# Patient Record
Sex: FEMALE | Race: OTHER | HISPANIC OR LATINO | URBAN - METROPOLITAN AREA
[De-identification: names, ages, dates, MRNs, and addresses within clinical notes are randomized per-mention and may not be internally consistent; named-entity substitution may affect disease eponyms.]

---

## 2024-01-06 ENCOUNTER — HOSPITAL ENCOUNTER (EMERGENCY)
Facility: HOSPITAL | Age: 74
Discharge: HOME | End: 2024-01-06
Attending: EMERGENCY MEDICINE

## 2024-01-06 VITALS
SYSTOLIC BLOOD PRESSURE: 186 MMHG | RESPIRATION RATE: 20 BRPM | OXYGEN SATURATION: 100 % | DIASTOLIC BLOOD PRESSURE: 86 MMHG | HEART RATE: 101 BPM | TEMPERATURE: 98 F

## 2024-01-06 DIAGNOSIS — R05.9 COUGH: ICD-10-CM

## 2024-01-06 DIAGNOSIS — I10 HTN (HYPERTENSION): ICD-10-CM

## 2024-01-06 DIAGNOSIS — J40 BRONCHITIS: Primary | ICD-10-CM

## 2024-01-06 DIAGNOSIS — Z76.0 MEDICATION REFILL: ICD-10-CM

## 2024-01-06 LAB
CTP QC/QA: YES
CTP QC/QA: YES
POC MOLECULAR INFLUENZA A AGN: NEGATIVE
POC MOLECULAR INFLUENZA B AGN: NEGATIVE
SARS-COV-2 RDRP RESP QL NAA+PROBE: NEGATIVE

## 2024-01-06 PROCEDURE — 87635 SARS-COV-2 COVID-19 AMP PRB: CPT | Performed by: EMERGENCY MEDICINE

## 2024-01-06 PROCEDURE — 93010 ELECTROCARDIOGRAM REPORT: CPT | Mod: ,,, | Performed by: INTERNAL MEDICINE

## 2024-01-06 PROCEDURE — 99284 EMERGENCY DEPT VISIT MOD MDM: CPT | Mod: 25

## 2024-01-06 PROCEDURE — 93005 ELECTROCARDIOGRAM TRACING: CPT

## 2024-01-06 PROCEDURE — 25000003 PHARM REV CODE 250: Performed by: PHYSICIAN ASSISTANT

## 2024-01-06 PROCEDURE — 87502 INFLUENZA DNA AMP PROBE: CPT

## 2024-01-06 PROCEDURE — 63600175 PHARM REV CODE 636 W HCPCS: Performed by: PHYSICIAN ASSISTANT

## 2024-01-06 RX ORDER — PREDNISONE 20 MG/1
60 TABLET ORAL DAILY
Qty: 9 TABLET | Refills: 0 | Status: SHIPPED | OUTPATIENT
Start: 2024-01-06 | End: 2024-01-09

## 2024-01-06 RX ORDER — LOSARTAN POTASSIUM 50 MG/1
40 TABLET ORAL DAILY
COMMUNITY
End: 2024-01-06 | Stop reason: CLARIF

## 2024-01-06 RX ORDER — OLMESARTAN MEDOXOMIL 40 MG/1
40 TABLET ORAL DAILY
Qty: 30 TABLET | Refills: 0 | Status: SHIPPED | OUTPATIENT
Start: 2024-01-06

## 2024-01-06 RX ORDER — ACETAMINOPHEN 500 MG
1000 TABLET ORAL
Status: COMPLETED | OUTPATIENT
Start: 2024-01-06 | End: 2024-01-06

## 2024-01-06 RX ORDER — PREDNISONE 20 MG/1
60 TABLET ORAL
Status: COMPLETED | OUTPATIENT
Start: 2024-01-06 | End: 2024-01-06

## 2024-01-06 RX ORDER — OLMESARTAN MEDOXOMIL 40 MG/1
40 TABLET ORAL DAILY
COMMUNITY
End: 2024-01-06

## 2024-01-06 RX ORDER — ALBUTEROL SULFATE 90 UG/1
1-2 AEROSOL, METERED RESPIRATORY (INHALATION) EVERY 6 HOURS PRN
Qty: 6.7 G | Refills: 1 | Status: SHIPPED | OUTPATIENT
Start: 2024-01-06

## 2024-01-06 RX ORDER — PROMETHAZINE HYDROCHLORIDE AND DEXTROMETHORPHAN HYDROBROMIDE 6.25; 15 MG/5ML; MG/5ML
5 SYRUP ORAL EVERY 4 HOURS PRN
Qty: 118 ML | Refills: 0 | Status: SHIPPED | OUTPATIENT
Start: 2024-01-06 | End: 2024-01-16

## 2024-01-06 RX ADMIN — ACETAMINOPHEN 1000 MG: 500 TABLET ORAL at 12:01

## 2024-01-06 RX ADMIN — PREDNISONE 60 MG: 20 TABLET ORAL at 01:01

## 2024-01-06 NOTE — ED PROVIDER NOTES
Encounter Date: 1/6/2024    SCRIBE #1 NOTE: I, Harini Figueroa, am scribing for, and in the presence of,  Jake Camarena PA-C.       History     Chief Complaint   Patient presents with    Headache     Pt to ER with reports of headache, cough, fever, since December 31st.      Monique Merchant is a 73 y.o. female, with a PMHx of Arthritis, HTN, who presents to the ED with a productive cough x1 week. Patient reports associated bilateral ear pressure and a frontal headache relieved with tylenol. Additionally asks for a refill of her antihypertensives but is unsure of which kind. Denies Hx of tobacco use, asthma or bronchitis. No other exacerbating or alleviating factors. Denies CP, SOB, nausea, vomiting, sore throat, congestion, fever or other associated symptoms.   Patient's family served as  via phone call as patient speak's Maori.  services were offered and declined.     The history is provided by the patient. No  was used.     Review of patient's allergies indicates:  No Known Allergies  Past Medical History:   Diagnosis Date    Arthritis     Hypertension      History reviewed. No pertinent surgical history.  History reviewed. No pertinent family history.  Social History     Tobacco Use    Smoking status: Never    Smokeless tobacco: Never   Substance Use Topics    Alcohol use: Never    Drug use: Never     Review of Systems   Constitutional:  Negative for fever.   HENT:  Negative for congestion, rhinorrhea and sore throat.         (+) ear pressure, bilateral   Eyes:  Negative for redness.   Respiratory:  Positive for cough. Negative for shortness of breath.    Cardiovascular:  Negative for chest pain and leg swelling.   Gastrointestinal:  Negative for abdominal pain, diarrhea, nausea and vomiting.   Musculoskeletal:  Negative for back pain.   Skin:  Negative for rash.   Neurological:  Positive for headaches. Negative for syncope.   All  other systems reviewed and are negative.      Physical Exam     Initial Vitals [01/06/24 1229]   BP Pulse Resp Temp SpO2   (!) 186/86 101 20 98.4 °F (36.9 °C) 100 %      MAP       --         Physical Exam    Nursing note and vitals reviewed.  Constitutional: She appears well-developed and well-nourished. She is not diaphoretic. No distress.   HENT:   Head: Atraumatic.   Right Ear: External ear normal.   Left Ear: External ear normal.   Bilateral non purulent ear effusions.  Bony landmarks discernible.  Nasal congestion without active rhinorrhea or sinus tenderness.  Dry mucous membranes with oropharynx otherwise normal.   Eyes: Conjunctivae and EOM are normal.   Neck: No tracheal deviation present.   Normal range of motion.  Cardiovascular:  Normal rate and regular rhythm.           Pulmonary/Chest: No accessory muscle usage or stridor. No tachypnea. No respiratory distress.   Faint end-expiratory wheezing   Musculoskeletal:         General: No edema. Normal range of motion.      Cervical back: Normal range of motion.     Neurological: She is alert and oriented to person, place, and time. She displays no tremor. She displays no seizure activity. Coordination and gait normal.   Skin: Skin is intact. Capillary refill takes less than 2 seconds. No rash noted. No erythema.         ED Course   Procedures  Labs Reviewed   POCT INFLUENZA A/B MOLECULAR   SARS-COV-2 RDRP GENE     EKG Readings: (Independently Interpreted)   Initial Reading: No STEMI. Rhythm: Normal Sinus Rhythm. Heart Rate: 88. Axis: Normal.       Imaging Results              X-Ray Chest AP Portable (Final result)  Result time 01/06/24 13:12:18      Final result by Valery Grey MD (01/06/24 13:12:18)                   Impression:      No acute abnormality.      Electronically signed by: Valery Grey MD  Date:    01/06/2024  Time:    13:12               Narrative:    EXAMINATION:  XR CHEST AP PORTABLE    CLINICAL HISTORY:  Cough,  unspecified    TECHNIQUE:  Single frontal view of the chest was performed.    COMPARISON:  None    FINDINGS:  The lungs are clear with normal appearance of pulmonary vasculature. No pleural effusion. No evident pneumothorax.    The cardiac silhouette is normal in size. The hilar and mediastinal contours are unremarkable.    Bones are intact.                                       Medications   acetaminophen tablet 1,000 mg (1,000 mg Oral Given 1/6/24 1252)   predniSONE tablet 60 mg (60 mg Oral Given 1/6/24 1344)     Medical Decision Making  Viral URI with associated bronchitis.  No hypoxia or respiratory distress.  No bacterial pneumonia or effusion.  EKG normal and consistent with ACS today.  COVID-19 and flu negative.  Bilateral ear effusions; non purulent. BP med refill.     Amount and/or Complexity of Data Reviewed  Radiology: ordered.    Risk  OTC drugs.  Prescription drug management.            Scribe Attestation:   Scribe #1: I performed the above scribed service and the documentation accurately describes the services I performed. I attest to the accuracy of the note.                          I, Jake Camarena PA-C, personally performed the services described in this documentation.  All medical record entries made by the scribe were at my direction and in my presence.  I have reviewed the chart and agree that the record reflects my personal performance and is accurate and complete.        Clinical Impression:  Final diagnoses:  [I10] HTN (hypertension)  [R05.9] Cough  [J40] Bronchitis (Primary)  [Z76.0] Medication refill          ED Disposition Condition    Discharge Stable          ED Prescriptions       Medication Sig Dispense Start Date End Date Auth. Provider    albuterol (PROVENTIL/VENTOLIN HFA) 90 mcg/actuation inhaler Inhale 1-2 puffs into the lungs every 6 (six) hours as needed for Wheezing. Rescue 6.7 g 1/6/2024 -- Jake Camarena PA-C    predniSONE (DELTASONE) 20 MG tablet Take 3 tablets (60 mg  total) by mouth once daily. for 3 days 9 tablet 1/6/2024 1/9/2024 Jake Camarena PA-C    promethazine-dextromethorphan (PROMETHAZINE-DM) 6.25-15 mg/5 mL Syrp Take 5 mLs by mouth every 4 (four) hours as needed (cough). 118 mL 1/6/2024 1/16/2024 Jake Camarena PA-C    olmesartan (BENICAR) 40 MG tablet Take 1 tablet (40 mg total) by mouth once daily. 30 tablet 1/6/2024 -- Jake Camarena PA-C          Follow-up Information       Follow up With Specialties Details Why Contact Info    [facility] -  Schedule an appointment as soon as possible for a visit in 1 day For reevaluation, AND to establish primary if you don't have a  [address] [place] [address]  [phone]      [facility] - Emergency Dept Emergency Medicine Go to  If symptoms worsen or new symptoms develop [address] [facility] [place] [id number] [phone]             Jake Camarena PA-C  01/06/24 9759

## 2024-01-11 ENCOUNTER — HOSPITAL ENCOUNTER (EMERGENCY)
Facility: HOSPITAL | Age: 74
Discharge: HOME | End: 2024-01-11
Attending: EMERGENCY MEDICINE

## 2024-01-11 VITALS
RESPIRATION RATE: 19 BRPM | HEART RATE: 75 BPM | HEIGHT: 65 IN | DIASTOLIC BLOOD PRESSURE: 84 MMHG | TEMPERATURE: 99 F | WEIGHT: 135 LBS | SYSTOLIC BLOOD PRESSURE: 174 MMHG | OXYGEN SATURATION: 99 % | BODY MASS INDEX: 22.49 KG/M2

## 2024-01-11 DIAGNOSIS — R00.0 TACHYCARDIA: ICD-10-CM

## 2024-01-11 DIAGNOSIS — J06.9 UPPER RESPIRATORY TRACT INFECTION, UNSPECIFIED TYPE: Primary | ICD-10-CM

## 2024-01-11 LAB
ALBUMIN SERPL BCP-MCNC: 3.9 G/DL (ref 3.5–5.2)
ALLENS TEST: NORMAL
ALP SERPL-CCNC: 81 U/L (ref 55–135)
ALT SERPL W/O P-5'-P-CCNC: 21 U/L (ref 10–44)
ANION GAP SERPL CALC-SCNC: 10 MMOL/L (ref 8–16)
AST SERPL-CCNC: 26 U/L (ref 10–40)
BASOPHILS # BLD AUTO: 0.06 K/UL (ref 0–0.2)
BASOPHILS NFR BLD: 0.4 % (ref 0–1.9)
BILIRUB SERPL-MCNC: 0.3 MG/DL (ref 0.1–1)
BILIRUB UR QL STRIP: NEGATIVE
BNP SERPL-MCNC: 12 PG/ML (ref 0–99)
BUN SERPL-MCNC: 23 MG/DL (ref 8–23)
CALCIUM SERPL-MCNC: 9.3 MG/DL (ref 8.7–10.5)
CHLORIDE SERPL-SCNC: 103 MMOL/L (ref 95–110)
CLARITY UR: CLEAR
CO2 SERPL-SCNC: 26 MMOL/L (ref 23–29)
COLOR UR: YELLOW
CREAT SERPL-MCNC: 0.9 MG/DL (ref 0.5–1.4)
CTP QC/QA: YES
CTP QC/QA: YES
DIFFERENTIAL METHOD BLD: ABNORMAL
EOSINOPHIL # BLD AUTO: 0.3 K/UL (ref 0–0.5)
EOSINOPHIL NFR BLD: 2.5 % (ref 0–8)
ERYTHROCYTE [DISTWIDTH] IN BLOOD BY AUTOMATED COUNT: 11.9 % (ref 11.5–14.5)
EST. GFR  (NO RACE VARIABLE): >60 ML/MIN/1.73 M^2
GLUCOSE SERPL-MCNC: 157 MG/DL (ref 70–110)
GLUCOSE UR QL STRIP: NEGATIVE
HCT VFR BLD AUTO: 41.6 % (ref 37–48.5)
HGB BLD-MCNC: 13.7 G/DL (ref 12–16)
HGB UR QL STRIP: NEGATIVE
HYALINE CASTS #/AREA URNS LPF: 1 /LPF
IMM GRANULOCYTES # BLD AUTO: 0.17 K/UL (ref 0–0.04)
IMM GRANULOCYTES NFR BLD AUTO: 1.2 % (ref 0–0.5)
KETONES UR QL STRIP: NEGATIVE
LDH SERPL L TO P-CCNC: 1.57 MMOL/L (ref 0.5–2.2)
LEUKOCYTE ESTERASE UR QL STRIP: ABNORMAL
LYMPHOCYTES # BLD AUTO: 6.5 K/UL (ref 1–4.8)
LYMPHOCYTES NFR BLD: 47.4 % (ref 18–48)
MCH RBC QN AUTO: 30 PG (ref 27–31)
MCHC RBC AUTO-ENTMCNC: 32.9 G/DL (ref 32–36)
MCV RBC AUTO: 91 FL (ref 82–98)
MICROSCOPIC COMMENT: ABNORMAL
MONOCYTES # BLD AUTO: 0.9 K/UL (ref 0.3–1)
MONOCYTES NFR BLD: 6.5 % (ref 4–15)
NEUTROPHILS # BLD AUTO: 5.8 K/UL (ref 1.8–7.7)
NEUTROPHILS NFR BLD: 42 % (ref 38–73)
NITRITE UR QL STRIP: NEGATIVE
NON-SQ EPI CELLS #/AREA URNS HPF: 1 /HPF
NRBC BLD-RTO: 0 /100 WBC
PH UR STRIP: 6 [PH] (ref 5–8)
PLATELET # BLD AUTO: 340 K/UL (ref 150–450)
PMV BLD AUTO: 9.8 FL (ref 9.2–12.9)
POC MOLECULAR INFLUENZA A AGN: NEGATIVE
POC MOLECULAR INFLUENZA B AGN: NEGATIVE
POTASSIUM SERPL-SCNC: 3.8 MMOL/L (ref 3.5–5.1)
PROT SERPL-MCNC: 8.1 G/DL (ref 6–8.4)
PROT UR QL STRIP: NEGATIVE
RBC # BLD AUTO: 4.57 M/UL (ref 4–5.4)
RBC #/AREA URNS HPF: 2 /HPF (ref 0–4)
SAMPLE: NORMAL
SARS-COV-2 RDRP RESP QL NAA+PROBE: NEGATIVE
SITE: NORMAL
SODIUM SERPL-SCNC: 139 MMOL/L (ref 136–145)
SP GR UR STRIP: 1.01 (ref 1–1.03)
TROPONIN I SERPL DL<=0.01 NG/ML-MCNC: <0.006 NG/ML (ref 0–0.03)
URN SPEC COLLECT METH UR: ABNORMAL
UROBILINOGEN UR STRIP-ACNC: NEGATIVE EU/DL
WBC # BLD AUTO: 13.78 K/UL (ref 3.9–12.7)
WBC #/AREA URNS HPF: 45 /HPF (ref 0–5)
WBC CLUMPS URNS QL MICRO: ABNORMAL

## 2024-01-11 PROCEDURE — 80053 COMPREHEN METABOLIC PANEL: CPT

## 2024-01-11 PROCEDURE — 87635 SARS-COV-2 COVID-19 AMP PRB: CPT | Performed by: EMERGENCY MEDICINE

## 2024-01-11 PROCEDURE — 81000 URINALYSIS NONAUTO W/SCOPE: CPT

## 2024-01-11 PROCEDURE — 96365 THER/PROPH/DIAG IV INF INIT: CPT

## 2024-01-11 PROCEDURE — 25000003 PHARM REV CODE 250

## 2024-01-11 PROCEDURE — 93005 ELECTROCARDIOGRAM TRACING: CPT

## 2024-01-11 PROCEDURE — 99285 EMERGENCY DEPT VISIT HI MDM: CPT | Mod: 25

## 2024-01-11 PROCEDURE — 63600175 PHARM REV CODE 636 W HCPCS

## 2024-01-11 PROCEDURE — 99900035 HC TECH TIME PER 15 MIN (STAT)

## 2024-01-11 PROCEDURE — 83605 ASSAY OF LACTIC ACID: CPT

## 2024-01-11 PROCEDURE — 84484 ASSAY OF TROPONIN QUANT: CPT

## 2024-01-11 PROCEDURE — 87502 INFLUENZA DNA AMP PROBE: CPT

## 2024-01-11 PROCEDURE — 87086 URINE CULTURE/COLONY COUNT: CPT

## 2024-01-11 PROCEDURE — 93010 ELECTROCARDIOGRAM REPORT: CPT | Mod: ,,, | Performed by: INTERNAL MEDICINE

## 2024-01-11 PROCEDURE — 96367 TX/PROPH/DG ADDL SEQ IV INF: CPT

## 2024-01-11 PROCEDURE — 83880 ASSAY OF NATRIURETIC PEPTIDE: CPT

## 2024-01-11 PROCEDURE — 87040 BLOOD CULTURE FOR BACTERIA: CPT | Mod: 59

## 2024-01-11 PROCEDURE — 85025 COMPLETE CBC W/AUTO DIFF WBC: CPT

## 2024-01-11 RX ORDER — BENZONATATE 100 MG/1
100 CAPSULE ORAL 3 TIMES DAILY PRN
Qty: 30 CAPSULE | Refills: 0 | Status: SHIPPED | OUTPATIENT
Start: 2024-01-11 | End: 2024-01-21

## 2024-01-11 RX ORDER — GUAIFENESIN 600 MG/1
1200 TABLET, EXTENDED RELEASE ORAL 2 TIMES DAILY
Qty: 40 TABLET | Refills: 0 | Status: SHIPPED | OUTPATIENT
Start: 2024-01-11 | End: 2024-01-21

## 2024-01-11 RX ORDER — AZITHROMYCIN 250 MG/1
250 TABLET, FILM COATED ORAL DAILY
Qty: 6 TABLET | Refills: 0 | Status: SHIPPED | OUTPATIENT
Start: 2024-01-11

## 2024-01-11 RX ORDER — ACETAMINOPHEN 500 MG
1000 TABLET ORAL
Status: COMPLETED | OUTPATIENT
Start: 2024-01-11 | End: 2024-01-11

## 2024-01-11 RX ADMIN — VANCOMYCIN HYDROCHLORIDE 1500 MG: 1.5 INJECTION, POWDER, LYOPHILIZED, FOR SOLUTION INTRAVENOUS at 02:01

## 2024-01-11 RX ADMIN — ACETAMINOPHEN 1000 MG: 500 TABLET ORAL at 02:01

## 2024-01-11 RX ADMIN — SODIUM CHLORIDE, POTASSIUM CHLORIDE, SODIUM LACTATE AND CALCIUM CHLORIDE 1836 ML: 600; 310; 30; 20 INJECTION, SOLUTION INTRAVENOUS at 02:01

## 2024-01-11 RX ADMIN — PIPERACILLIN AND TAZOBACTAM 4.5 G: 4; .5 INJECTION, POWDER, LYOPHILIZED, FOR SOLUTION INTRAVENOUS; PARENTERAL at 02:01

## 2024-01-11 NOTE — DISCHARGE INSTRUCTIONS
Please follow up with the primary care provider for re-evaluation within the next 1-2 days   Recommend staying well hydrated.    Seek medical care if symptoms worsening or concerns.

## 2024-01-11 NOTE — ED PROVIDER NOTES
"Encounter Date: 1/11/2024       History     Chief Complaint   Patient presents with    Cough     Pt reporting a dry cough, shakiness, and "hearing voices at night that call her name" since 12/31/23.     Tachycardia     73-year-old female history of hypertension.  Patient presents with the family.  Patient states that since December 24th she has been symptomatic.  The patient reports nonproductive cough.  Patient reports occasional myalgias.  Patient reports occasional dizziness.  Patient reports associated nasal congestion or rhinorrhea.  The patient states that over the last few nights she has noted while sleeping that she hears voices while sleeping.  This does not occur while she is awake.  She is cognizant that these are likely dreams.      Review of patient's allergies indicates:  No Known Allergies  Past Medical History:   Diagnosis Date    Arthritis     Hypertension      No past surgical history on file.  No family history on file.  Social History     Tobacco Use    Smoking status: Never    Smokeless tobacco: Never   Substance Use Topics    Alcohol use: Never    Drug use: Never     Review of Systems   Constitutional:  Positive for chills and fatigue. Negative for fever.   HENT:  Positive for congestion and rhinorrhea. Negative for sore throat.    Respiratory:  Positive for cough. Negative for shortness of breath.    Cardiovascular:  Negative for chest pain.   Gastrointestinal:  Positive for nausea. Negative for abdominal pain and vomiting.   Genitourinary:  Negative for dysuria and frequency.   Musculoskeletal:  Negative for back pain.   Skin:  Negative for rash.   Neurological:  Negative for dizziness, weakness, light-headedness and headaches.   Psychiatric/Behavioral:  Negative for confusion.        Physical Exam     Initial Vitals [01/11/24 1353]   BP Pulse Resp Temp SpO2   (!) 192/103 (!) 118 20 99.2 °F (37.3 °C) 100 %      MAP       --         Physical Exam    Nursing note and vitals " reviewed.  Constitutional: She appears well-developed and well-nourished. She is not diaphoretic. She does not appear ill. No distress.   HENT:   Head: Normocephalic and atraumatic.   Mouth/Throat: Oropharynx is clear and moist. No oropharyngeal exudate, posterior oropharyngeal edema or posterior oropharyngeal erythema.   Eyes: Conjunctivae and EOM are normal. Right conjunctiva is not injected. Left conjunctiva is not injected.   Neck:   Normal range of motion.  Cardiovascular:  Regular rhythm and normal heart sounds.   Tachycardia present.         No murmur heard.  Pulmonary/Chest: Breath sounds normal. No respiratory distress. She has no wheezes.   Abdominal: Abdomen is soft. There is no abdominal tenderness.   Musculoskeletal:         General: No edema.      Cervical back: Normal range of motion.      Comments: No lower extremity edema.     Neurological: She is alert. GCS score is 15.   Skin: Skin is warm.   Psychiatric: She has a normal mood and affect.         ED Course   Procedures  Labs Reviewed   CBC W/ AUTO DIFFERENTIAL - Abnormal; Notable for the following components:       Result Value    WBC 13.78 (*)     Immature Granulocytes 1.2 (*)     Immature Grans (Abs) 0.17 (*)     Lymph # 6.5 (*)     All other components within normal limits   COMPREHENSIVE METABOLIC PANEL - Abnormal; Notable for the following components:    Glucose 157 (*)     All other components within normal limits   URINALYSIS, REFLEX TO URINE CULTURE - Abnormal; Notable for the following components:    Leukocytes, UA 3+ (*)     All other components within normal limits    Narrative:     Specimen Source->Urine   URINALYSIS MICROSCOPIC - Abnormal; Notable for the following components:    WBC, UA 45 (*)     Non-Squam Epith 1 (*)     All other components within normal limits    Narrative:     Specimen Source->Urine   CULTURE, BLOOD    Narrative:     Aerobic and anaerobic   CULTURE, BLOOD    Narrative:     Aerobic and anaerobic   CULTURE, URINE    TROPONIN I   B-TYPE NATRIURETIC PEPTIDE   B-TYPE NATRIURETIC PEPTIDE   TROPONIN I   SARS-COV-2 RDRP GENE   POCT INFLUENZA A/B MOLECULAR   ISTAT LACTATE          Imaging Results              X-Ray Chest AP Portable (Final result)  Result time 01/11/24 14:59:44   Procedure changed from X-Ray Chest PA And Lateral     Final result by Cristobal Ho MD (01/11/24 14:59:44)                   Impression:      No convincing evidence of acute cardiopulmonary disease.      Electronically signed by: Cristobal Ho  Date:    01/11/2024  Time:    14:59               Narrative:    EXAMINATION:  XR CHEST AP PORTABLE    CLINICAL HISTORY:  Tachycardia; Tachycardia, unspecified    TECHNIQUE:  Single frontal view of the chest was performed.    COMPARISON:  Chest radiograph performed 01/06/2024, 12:59 hours.    FINDINGS:  Monitoring leads overlie the chest.  Cardiomediastinal contours appear to be within normal limits.    Lungs appear essentially clear.    No definite pneumothorax or large volume pleural effusion.    No acute findings in the visualized abdomen.    Osseous and soft tissue structures appear without definite acute change.                                       Medications   lactated ringers bolus 1,836 mL (0 mLs Intravenous Stopped 1/11/24 1518)   acetaminophen tablet 1,000 mg (1,000 mg Oral Given 1/11/24 1416)   piperacillin-tazobactam (ZOSYN) 4.5 g in dextrose 5 % in water (D5W) 100 mL IVPB (MB+) (0 g Intravenous Stopped 1/11/24 1501)   vancomycin 1.5 g in dextrose 5 % 250 mL IVPB (ready to mix) (0 mg Intravenous Stopped 1/11/24 1607)     Medical Decision Making    73-year-old female presenting with roughly 2 weeks of cough, congestion, myalgias.  Clear lung sounds on auscultation.  No lower extremity edema to suggest fluid overload.  Suspect URI given findings.  Chest x-ray shows no infiltrate.  Patient does not appear to be in respiratory distress.  SARS-COVID-2 and influenza screen negative.  Given the timeframe  of the patient's symptoms patient was started on a Z-Pinky.  There was concern of possible voices that occurred while patient was sleeping.  I suspect this is the patient's dreaming in the setting of poor sleep.  The patient does not appear to be having any hallucinations while awake.  Patient appears to be thinking clearly at this time.  Patient is afebrile.  Patient is to follow up with her primary care provider for re-evaluation.      Differential diagnosis includes URI, lower respiratory tract infection, pulmonary edema    Amount and/or Complexity of Data Reviewed  Labs: ordered.  ECG/medicine tests:  Decision-making details documented in ED Course.    Risk  OTC drugs.  Prescription drug management.               ED Course as of 01/11/24 2220   Thu Jan 11, 2024   1524 EKG 12-lead  Time 2:22 p.m.    Rate 88, sinus, regular rhythm, normal axis.   QRS 74 QTC of 425.  No ST elevation or depression.  T-wave inversion lead 3.  Q-wave lead 3, AVF.  No hyperacute T-waves.      Normal sinus rhythm with LVH and inferior Q-waves. [JM]      ED Course User Index  [JM] Kleber Perea MD                           Clinical Impression:  Final diagnoses:  [R00.0] Tachycardia  [J06.9] Upper respiratory tract infection, unspecified type (Primary)          ED Disposition Condition    Discharge Stable          ED Prescriptions       Medication Sig Dispense Start Date End Date Auth. Provider    azithromycin (Z-PINKY) 250 MG tablet Take 1 tablet (250 mg total) by mouth once daily. Take first 2 tablets together, then 1 every day until finished. 6 tablet 1/11/2024 -- Kleber Perea MD    benzonatate (TESSALON) 100 MG capsule Take 1 capsule (100 mg total) by mouth 3 (three) times daily as needed. 30 capsule 1/11/2024 1/21/2024 Kleber Perea MD    guaiFENesin (MUCINEX) 600 mg 12 hr tablet Take 2 tablets (1,200 mg total) by mouth 2 (two) times daily. for 10 days 40 tablet 1/11/2024 1/21/2024 Kleber Perea MD           Follow-up Information       Follow up With Specialties Details Why Contact Info    [facility]  Schedule an appointment as soon as possible for a visit in 3 days Primary care [address]  [facility] [address]    [facility] - Emergency Dept Emergency Medicine  If symptoms worsen [address] [facility] [place] [address]  [phone]             Kleber Perea MD  01/11/24 5699

## 2024-01-13 LAB — BACTERIA UR CULT: NORMAL

## 2024-01-15 LAB
BACTERIA BLD CULT: NORMAL
BACTERIA BLD CULT: NORMAL

## 2024-01-17 ENCOUNTER — HOSPITAL ENCOUNTER (EMERGENCY)
Facility: HOSPITAL | Age: 74
Discharge: HOME | End: 2024-01-18
Attending: STUDENT IN AN ORGANIZED HEALTH CARE EDUCATION/TRAINING PROGRAM

## 2024-01-17 DIAGNOSIS — R51.9 NONINTRACTABLE HEADACHE, UNSPECIFIED CHRONICITY PATTERN, UNSPECIFIED HEADACHE TYPE: Primary | ICD-10-CM

## 2024-01-17 DIAGNOSIS — R05.9 COUGH: ICD-10-CM

## 2024-01-17 DIAGNOSIS — R42 LIGHT HEADEDNESS: ICD-10-CM

## 2024-01-17 LAB
ALBUMIN SERPL BCP-MCNC: 3.8 G/DL (ref 3.5–5.2)
ALP SERPL-CCNC: 87 U/L (ref 55–135)
ALT SERPL W/O P-5'-P-CCNC: 22 U/L (ref 10–44)
ANION GAP SERPL CALC-SCNC: 11 MMOL/L (ref 8–16)
AST SERPL-CCNC: 27 U/L (ref 10–40)
BASOPHILS # BLD AUTO: 0.05 K/UL (ref 0–0.2)
BASOPHILS NFR BLD: 0.5 % (ref 0–1.9)
BILIRUB SERPL-MCNC: 0.2 MG/DL (ref 0.1–1)
BNP SERPL-MCNC: <10 PG/ML (ref 0–99)
BUN SERPL-MCNC: 15 MG/DL (ref 8–23)
CALCIUM SERPL-MCNC: 9.2 MG/DL (ref 8.7–10.5)
CHLORIDE SERPL-SCNC: 107 MMOL/L (ref 95–110)
CO2 SERPL-SCNC: 21 MMOL/L (ref 23–29)
CREAT SERPL-MCNC: 0.8 MG/DL (ref 0.5–1.4)
CTP QC/QA: YES
CTP QC/QA: YES
DIFFERENTIAL METHOD BLD: ABNORMAL
EOSINOPHIL # BLD AUTO: 0.4 K/UL (ref 0–0.5)
EOSINOPHIL NFR BLD: 4.4 % (ref 0–8)
ERYTHROCYTE [DISTWIDTH] IN BLOOD BY AUTOMATED COUNT: 11.6 % (ref 11.5–14.5)
EST. GFR  (NO RACE VARIABLE): >60 ML/MIN/1.73 M^2
GLUCOSE SERPL-MCNC: 100 MG/DL (ref 70–110)
HCT VFR BLD AUTO: 38.1 % (ref 37–48.5)
HGB BLD-MCNC: 13.1 G/DL (ref 12–16)
IMM GRANULOCYTES # BLD AUTO: 0.04 K/UL (ref 0–0.04)
IMM GRANULOCYTES NFR BLD AUTO: 0.4 % (ref 0–0.5)
LYMPHOCYTES # BLD AUTO: 3.6 K/UL (ref 1–4.8)
LYMPHOCYTES NFR BLD: 37 % (ref 18–48)
MCH RBC QN AUTO: 30.5 PG (ref 27–31)
MCHC RBC AUTO-ENTMCNC: 34.4 G/DL (ref 32–36)
MCV RBC AUTO: 89 FL (ref 82–98)
MONOCYTES # BLD AUTO: 1.3 K/UL (ref 0.3–1)
MONOCYTES NFR BLD: 13.5 % (ref 4–15)
NEUTROPHILS # BLD AUTO: 4.3 K/UL (ref 1.8–7.7)
NEUTROPHILS NFR BLD: 44.2 % (ref 38–73)
NRBC BLD-RTO: 0 /100 WBC
PLATELET # BLD AUTO: 271 K/UL (ref 150–450)
PMV BLD AUTO: 9.8 FL (ref 9.2–12.9)
POC MOLECULAR INFLUENZA A AGN: NEGATIVE
POC MOLECULAR INFLUENZA B AGN: NEGATIVE
POTASSIUM SERPL-SCNC: 4 MMOL/L (ref 3.5–5.1)
PROT SERPL-MCNC: 7.9 G/DL (ref 6–8.4)
RBC # BLD AUTO: 4.29 M/UL (ref 4–5.4)
SARS-COV-2 RDRP RESP QL NAA+PROBE: NEGATIVE
SODIUM SERPL-SCNC: 139 MMOL/L (ref 136–145)
TROPONIN I SERPL DL<=0.01 NG/ML-MCNC: <0.006 NG/ML (ref 0–0.03)
WBC # BLD AUTO: 9.79 K/UL (ref 3.9–12.7)

## 2024-01-17 PROCEDURE — 93010 ELECTROCARDIOGRAM REPORT: CPT | Mod: ,,, | Performed by: INTERNAL MEDICINE

## 2024-01-17 PROCEDURE — 84484 ASSAY OF TROPONIN QUANT: CPT

## 2024-01-17 PROCEDURE — 25000003 PHARM REV CODE 250: Performed by: STUDENT IN AN ORGANIZED HEALTH CARE EDUCATION/TRAINING PROGRAM

## 2024-01-17 PROCEDURE — 99285 EMERGENCY DEPT VISIT HI MDM: CPT | Mod: 25

## 2024-01-17 PROCEDURE — 87635 SARS-COV-2 COVID-19 AMP PRB: CPT | Performed by: STUDENT IN AN ORGANIZED HEALTH CARE EDUCATION/TRAINING PROGRAM

## 2024-01-17 PROCEDURE — 85025 COMPLETE CBC W/AUTO DIFF WBC: CPT

## 2024-01-17 PROCEDURE — 96375 TX/PRO/DX INJ NEW DRUG ADDON: CPT

## 2024-01-17 PROCEDURE — 96374 THER/PROPH/DIAG INJ IV PUSH: CPT

## 2024-01-17 PROCEDURE — 63600175 PHARM REV CODE 636 W HCPCS: Performed by: STUDENT IN AN ORGANIZED HEALTH CARE EDUCATION/TRAINING PROGRAM

## 2024-01-17 PROCEDURE — 93005 ELECTROCARDIOGRAM TRACING: CPT

## 2024-01-17 PROCEDURE — 80053 COMPREHEN METABOLIC PANEL: CPT

## 2024-01-17 PROCEDURE — 87502 INFLUENZA DNA AMP PROBE: CPT

## 2024-01-17 PROCEDURE — 96361 HYDRATE IV INFUSION ADD-ON: CPT

## 2024-01-17 PROCEDURE — 83880 ASSAY OF NATRIURETIC PEPTIDE: CPT

## 2024-01-17 RX ORDER — KETOROLAC TROMETHAMINE 30 MG/ML
15 INJECTION, SOLUTION INTRAMUSCULAR; INTRAVENOUS
Status: COMPLETED | OUTPATIENT
Start: 2024-01-17 | End: 2024-01-17

## 2024-01-17 RX ORDER — PROCHLORPERAZINE EDISYLATE 5 MG/ML
5 INJECTION INTRAMUSCULAR; INTRAVENOUS ONCE
Status: COMPLETED | OUTPATIENT
Start: 2024-01-17 | End: 2024-01-17

## 2024-01-17 RX ORDER — DIPHENHYDRAMINE HYDROCHLORIDE 50 MG/ML
25 INJECTION INTRAMUSCULAR; INTRAVENOUS
Status: COMPLETED | OUTPATIENT
Start: 2024-01-17 | End: 2024-01-17

## 2024-01-17 RX ADMIN — KETOROLAC TROMETHAMINE 15 MG: 30 INJECTION, SOLUTION INTRAMUSCULAR; INTRAVENOUS at 10:01

## 2024-01-17 RX ADMIN — PROCHLORPERAZINE EDISYLATE 5 MG: 5 INJECTION INTRAMUSCULAR; INTRAVENOUS at 10:01

## 2024-01-17 RX ADMIN — SODIUM CHLORIDE 1000 ML: 9 INJECTION, SOLUTION INTRAVENOUS at 10:01

## 2024-01-17 RX ADMIN — DIPHENHYDRAMINE HYDROCHLORIDE 25 MG: 50 INJECTION, SOLUTION INTRAMUSCULAR; INTRAVENOUS at 10:01

## 2024-01-18 VITALS
HEART RATE: 85 BPM | TEMPERATURE: 99 F | BODY MASS INDEX: 22.47 KG/M2 | WEIGHT: 135 LBS | DIASTOLIC BLOOD PRESSURE: 68 MMHG | RESPIRATION RATE: 20 BRPM | SYSTOLIC BLOOD PRESSURE: 147 MMHG | OXYGEN SATURATION: 99 %

## 2024-01-18 NOTE — ED NOTES
Pt arrives to ED with c/o headache and nausea. Pt reports having headache all day today, nauseated, has not taking daily medications. Reports not being around sick contacts, subjective fever at home, reports taking tylenol with minimal relief of symptoms. Pt lying in bed, respirations even, unlabored, NAD noted, answering questions appropriately. Pt placed on BP cycling, pulse ox, and tele.

## 2024-01-18 NOTE — DISCHARGE INSTRUCTIONS
Abhishek por venir a nuestro Departamento de Emergencias holambert. Es importante recordar que algunos problemas o condiciones médicas son difíciles de diagnosticar y es posible que no se encuentren tiara vines visita al Departamento de Emergencias.    Asegúrese de hacer un seguimiento con vines médico de atención primaria y revisar con ellos todos los laboratorios/imágenes/pruebas que se realizaron tiara vines visita a la tanvi de emergencias. Algunos laboratorios/pruebas pueden estar fuera del rango normal y requieren un seguimiento que no sea de emergencia y joan mayor investigación para ayudar a diagnosticar/excluir/prevenir complicaciones u otras afecciones médicas potencialmente graves que no se abordaron tiara vines visita a la tanvi de emergencias.    Si no tiene un médico de atención primaria, puede comunicarse con el que figura en vines documentación de jessica o también puede llamar al mostrador de citas de la [facility] al [phone] para programar joan radha y establecer atención con letty. Otros recursos para encontrar médicos de atención primaria: [email or web address] Es importante para vines catracho que tenga un médico de atención primaria.    [place] todos los medicamentos según las indicaciones. Todos los medicamentos pueden tener efectos secundarios y es imposible predecir qué medicamentos pueden causar efectos secundarios o qué efectos secundarios (si los hay) le darán. Si siente que está teniendo un efecto negativo o un efecto secundario de algún medicamento, debe dejar de tomarlo inmediatamente y buscar atención médica. Si siente que está teniendo joan reacción potencialmente mortal, llame al 911.    Regrese a la tanvi de emergencias si tiene preguntas/inquietudes, síntomas nuevos/preocupantes, empeoramiento o falta de mejora.    No conduzca, nade, suba a [place], se bañe, opere maquinaria pesada, kailash alcohol o tome medicamentos potencialmente sedantes, firme ningún documento legal o tome decisiones importantes tiara  24 horas si ha recibido algún medicamento para el dolor, sedantes o alteradores del estado de ánimo. medicamentos tiara vines visita a la tanvi de emergencias o dentro de las 24 horas de haberlos tomado si se los kaufman recetado.    Puede encontrar recursos adicionales para dentistas, audífonos, equipos médicos duraderos, farmacias de bajo costo y otros recursos en [email or web address]

## 2024-01-18 NOTE — ED PROVIDER NOTES
Encounter Date: 1/17/2024       History     Chief Complaint   Patient presents with    Headache     With nausea, abd pain and neck pain since the 31st, has not taken blood pressure meds due to 90 systolic at home.      73-year-old female presents for headaches ongoing since December 31st, coming and going, never constant, associated with decreased p.o. intake, nausea and neck soreness.  She has had similar headaches in the past.  She also reports overall poor vision, worsening, and she needs to see an eye doctor.  She denies fevers or chills.  She denies cough or shortness of breath.  She denies dysuria hematuria urgency or frequency.  She is eating, and can not say what makes the nausea come and go.  She denies abdominal pain      Review of patient's allergies indicates:  No Known Allergies  Past Medical History:   Diagnosis Date    Arthritis     Hypertension      No past surgical history on file.  No family history on file.  Social History     Tobacco Use    Smoking status: Never    Smokeless tobacco: Never   Substance Use Topics    Alcohol use: Never    Drug use: Never     Review of Systems   Gastrointestinal:  Positive for nausea.   Neurological:  Positive for headaches.       Physical Exam     Initial Vitals [01/17/24 1936]   BP Pulse Resp Temp SpO2   (!) 228/108 110 18 98.9 °F (37.2 °C) 100 %      MAP       --         Physical Exam    Nursing note and vitals reviewed.  Constitutional: She appears well-developed and well-nourished. She is not diaphoretic.   HENT:   Head: Normocephalic and atraumatic.   Mouth/Throat: Oropharynx is clear and moist.   Eyes: EOM are normal. Pupils are equal, round, and reactive to light. Right eye exhibits no discharge. Left eye exhibits no discharge.   Neck: No tracheal deviation present.   Normal range of motion.  Cardiovascular:  Normal rate, regular rhythm and intact distal pulses.           Pulmonary/Chest: No respiratory distress. She has no wheezes. She exhibits no  tenderness.   Abdominal: Abdomen is soft. She exhibits no distension. There is no abdominal tenderness.   Musculoskeletal:         General: No tenderness or edema. Normal range of motion.      Cervical back: Normal range of motion.     Neurological: She is alert and oriented to person, place, and time. She has normal strength and normal reflexes. No cranial nerve deficit or sensory deficit. Coordination and gait normal. GCS eye subscore is 4. GCS verbal subscore is 5. GCS motor subscore is 6.   Normal finger to nose, heel to shin, rapid alternating movement.    Skin: Skin is warm and dry. No rash noted.   Psychiatric: She has a normal mood and affect. Her behavior is normal. Thought content normal.         ED Course   Procedures  Labs Reviewed   CBC W/ AUTO DIFFERENTIAL - Abnormal; Notable for the following components:       Result Value    Mono # 1.3 (*)     All other components within normal limits   COMPREHENSIVE METABOLIC PANEL - Abnormal; Notable for the following components:    CO2 21 (*)     All other components within normal limits   B-TYPE NATRIURETIC PEPTIDE   TROPONIN I   URINALYSIS, REFLEX TO URINE CULTURE   POCT INFLUENZA A/B MOLECULAR   SARS-COV-2 RDRP GENE          Imaging Results              CT Head Without Contrast (Final result)  Result time 01/17/24 21:10:31      Final result by Trina Slade MD (01/17/24 21:10:31)                   Impression:      No acute intracranial abnormality detected.      Electronically signed by: Trina Slade  Date:    01/17/2024  Time:    21:10               Narrative:    EXAMINATION:  CT OF THE HEAD WITHOUT    CLINICAL HISTORY:  Headache, new or worsening (Age >= 50y);    TECHNIQUE:  5 mm unenhanced axial images were obtained from the skull base to the vertex.    COMPARISON:  None.    FINDINGS:  The ventricles, basal cisterns, and cortical sulci are within normal limits for patient's stated age. There is no acute intracranial hemorrhage, territorial infarct  or mass effect, or midline shift. The visualized paranasal sinuses and mastoid air cells are clear.  The sella is empty.                                       X-Ray Chest PA And Lateral (Final result)  Result time 01/17/24 20:22:43      Final result by Trina Slade MD (01/17/24 20:22:43)                   Impression:      As above described.      Electronically signed by: Trina Slade  Date:    01/17/2024  Time:    20:22               Narrative:    EXAMINATION:  CHEST PA AND LATERAL    CLINICAL HISTORY:  Cough, unspecified    TECHNIQUE:  PA and lateral chest radiograph    COMPARISON:  01/11/2024    FINDINGS:  The cardiac silhouette is within normal limits.  There is tortuosity and ectasia of the descending thoracic aorta.  Mild bibasilar densities are present.  Findings may represent atelectasis versus early infiltrates.  There is no pneumothorax or significant pleural effusion.                                       Medications   sodium chloride 0.9% bolus 1,000 mL 1,000 mL (1,000 mLs Intravenous New Bag 1/17/24 2218)   prochlorperazine injection Soln 5 mg (5 mg Intravenous Given 1/17/24 2219)   diphenhydrAMINE injection 25 mg (25 mg Intravenous Given 1/17/24 2218)   ketorolac injection 15 mg (15 mg Intravenous Given 1/17/24 2219)     Medical Decision Making  Differential diagnosis includes headache syndrome, intracranial mass, intracranial bleed, stroke, COVID, influenza, anemia, electrolyte abnormality, uremia    Amount and/or Complexity of Data Reviewed  Labs: ordered.    Risk  Prescription drug management.               ED Course as of 01/18/24 0019   Thu Jan 18, 2024   0017 Comprehensive metabolic panel(!) [BS]   0017 Brain natriuretic peptide [BS]   0017 POCT Influenza A/B Molecular [BS]   0017 POCT COVID-19 Rapid Screening [BS]   0017 Troponin I [BS]   0017 CBC auto differential(!) [BS]   0017 CT Head Without Contrast [BS]   0017 X-Ray Chest PA And Lateral [BS]      ED Course User Index  [BS]  Jameel Lundberg MD                         73-year-old female presents for 3-weeks of intermittent headaches.  Vital signs here notable initially for hypertension, improved with pain control of her headache.  After complete evaluation, including thorough history and physical exam, the patient's symptoms are most likely due to primary headache syndrome. The patient's headaches are similar in character to prior. The history does not suggest sudden/maximal onset of pain consistent with SAH/intracranial bleed. Physical exam is benign without focal weakness, sensory deficit, or cerebellar signs to suggest stroke or intracranial mass.  CT head without contrast shows no acute intracranial abnormality, no mass.  There is no meningismus, fever, or evidence of infection to suggest meningitis/encephalitis. The patient was treated with supportive care and improved.  CBC and CMP within normal limits.  EKG troponin BNP within normal limits.  On reexamination, patient felt much better.  She is tolerating p.o., feeling well, stable for discharge with outpatient follow-up and return precautions    Clinical Impression:  Final diagnoses:  [R05.9] Cough  [R42] Light headedness  [R51.9] Nonintractable headache, unspecified chronicity pattern, unspecified headache type (Primary)          ED Disposition Condition    Discharge Stable          ED Prescriptions    None       Follow-up Information       Follow up With Specialties Details Why Contact Info    [facility] -  Call today To set up a follow-up appointment, To recheck today's symptoms [address] [place] [address]  [phone]               Jameel Lundberg MD  01/18/24 0019

## 2024-07-25 ENCOUNTER — HOSPITAL ENCOUNTER (EMERGENCY)
Facility: HOSPITAL | Age: 74
Discharge: HOME | End: 2024-07-25
Attending: EMERGENCY MEDICINE

## 2024-07-25 VITALS
RESPIRATION RATE: 20 BRPM | OXYGEN SATURATION: 99 % | BODY MASS INDEX: 25.13 KG/M2 | TEMPERATURE: 98 F | HEART RATE: 75 BPM | SYSTOLIC BLOOD PRESSURE: 132 MMHG | DIASTOLIC BLOOD PRESSURE: 75 MMHG | WEIGHT: 151 LBS

## 2024-07-25 DIAGNOSIS — U07.1 COVID-19 VIRUS DETECTED: ICD-10-CM

## 2024-07-25 DIAGNOSIS — R05.9 COUGH: ICD-10-CM

## 2024-07-25 DIAGNOSIS — U07.1 COVID-19: Primary | ICD-10-CM

## 2024-07-25 LAB
CTP QC/QA: YES
MOLECULAR STREP A: NEGATIVE
POC MOLECULAR INFLUENZA A AGN: NEGATIVE
POC MOLECULAR INFLUENZA B AGN: NEGATIVE
SARS-COV-2 RDRP RESP QL NAA+PROBE: POSITIVE

## 2024-07-25 PROCEDURE — 99284 EMERGENCY DEPT VISIT MOD MDM: CPT | Mod: 25

## 2024-07-25 PROCEDURE — 87651 STREP A DNA AMP PROBE: CPT

## 2024-07-25 PROCEDURE — 87502 INFLUENZA DNA AMP PROBE: CPT

## 2024-07-25 PROCEDURE — 87635 SARS-COV-2 COVID-19 AMP PRB: CPT

## 2024-07-25 RX ORDER — PROMETHAZINE HYDROCHLORIDE AND DEXTROMETHORPHAN HYDROBROMIDE 6.25; 15 MG/5ML; MG/5ML
5 SYRUP ORAL EVERY 6 HOURS PRN
Qty: 118 ML | Refills: 0 | Status: SHIPPED | OUTPATIENT
Start: 2024-07-25 | End: 2024-08-04

## 2024-07-25 RX ORDER — ACETAMINOPHEN 500 MG
500 TABLET ORAL EVERY 4 HOURS PRN
Qty: 30 TABLET | Refills: 0 | Status: SHIPPED | OUTPATIENT
Start: 2024-07-25

## 2024-07-25 RX ORDER — CETIRIZINE HYDROCHLORIDE 10 MG/1
10 TABLET ORAL DAILY
Qty: 30 TABLET | Refills: 0 | Status: SHIPPED | OUTPATIENT
Start: 2024-07-25 | End: 2025-07-25

## 2024-07-25 RX ORDER — FLUTICASONE PROPIONATE 50 MCG
1 SPRAY, SUSPENSION (ML) NASAL 2 TIMES DAILY PRN
Qty: 15 G | Refills: 0 | Status: SHIPPED | OUTPATIENT
Start: 2024-07-25

## 2024-07-25 RX ORDER — BENZONATATE 100 MG/1
100 CAPSULE ORAL 3 TIMES DAILY PRN
Qty: 20 CAPSULE | Refills: 0 | Status: SHIPPED | OUTPATIENT
Start: 2024-07-25 | End: 2024-08-04

## 2024-07-25 RX ORDER — IBUPROFEN 600 MG/1
600 TABLET ORAL EVERY 6 HOURS PRN
Qty: 30 TABLET | Refills: 0 | Status: SHIPPED | OUTPATIENT
Start: 2024-07-25

## 2024-07-25 NOTE — DISCHARGE INSTRUCTIONS
Regrese al Departamento de Emergencias si presenta cualquier síntoma nuevo o que empeore, incluidos: fiebre, dolor en el pecho, dificultad para respirar, pérdida del conocimiento, mareos, debilidad o cualquier otra inquietud.     Jabari un seguimiento con vines proveedor de atención primaria dentro de la semana. Si no tiene letty, puede comunicarse con el que figura en vines documentación de jessica o también puede llamar al mostrador de citas de la [facility] al [phone] para programar joan radha con letty.     [place] todos los medicamentos según lo recetado.

## 2024-07-25 NOTE — ED PROVIDER NOTES
Encounter Date: 7/25/2024       History     Chief Complaint   Patient presents with    Cough     Pt complaining of cough, weakness, HA since Sunday unrelieved by tylenol.      74-year-old female with past medical history of hypertension, arthritis presents to ED for emergent evaluation of nonproductive cough, chills, generalized myalgias, headache, rhinorrhea, nasal congestion, sneezing, subjective fever that started 4 days ago.  She states her son-in-law and daughter also has similar symptoms.  She attempted Tylenol prior to arrival with transient relief to her symptoms.  She denies any headache at this time.  She has not yet taken her blood pressure medications yet today.  She denies any sore throat, dysphagia, chest pain, shortness of breath, abdominal pain, nausea, vomiting, diarrhea, dysuria, hematuria.  No other symptoms reported.    The history is provided by the patient. The history is limited by a language barrier (Lucinda #[id number]). A  was used.     Review of patient's allergies indicates:  No Known Allergies  Past Medical History:   Diagnosis Date    Arthritis     Hypertension      No past surgical history on file.  No family history on file.  Social History     Tobacco Use    Smoking status: Never    Smokeless tobacco: Never   Substance Use Topics    Alcohol use: Never    Drug use: Never     Review of Systems   Constitutional:  Positive for chills and fever (subjective).   HENT:  Positive for congestion, rhinorrhea and sneezing. Negative for ear pain and sore throat.    Eyes:  Negative for redness.   Respiratory:  Positive for cough. Negative for chest tightness and shortness of breath.         (-) hemoptysis   Cardiovascular:  Negative for chest pain, palpitations and leg swelling.   Gastrointestinal:  Negative for abdominal pain, diarrhea, nausea and vomiting.   Genitourinary:  Negative for decreased urine volume, difficulty urinating, dysuria, frequency, hematuria and urgency.    Musculoskeletal:  Positive for myalgias. Negative for back pain and neck pain.   Skin:  Negative for rash.   Neurological:  Positive for headaches.   Psychiatric/Behavioral:  Negative for confusion.        Physical Exam     Initial Vitals [07/25/24 1151]   BP Pulse Resp Temp SpO2   (!) 191/97 82 20 98.1 °F (36.7 °C) 98 %      MAP       --         Physical Exam    Nursing note and vitals reviewed.  Constitutional: She appears well-developed and well-nourished.  Non-toxic appearance. She does not appear ill.   HENT:   Head: Normocephalic and atraumatic.   Right Ear: Hearing, tympanic membrane, external ear and ear canal normal. Tympanic membrane is not perforated, not erythematous and not bulging.   Left Ear: Hearing, tympanic membrane, external ear and ear canal normal. Tympanic membrane is not perforated, not erythematous and not bulging.   Nose: Nose normal.   Mouth/Throat: Uvula is midline, oropharynx is clear and moist and mucous membranes are normal.   Eyes: Conjunctivae and EOM are normal.   Neck: Neck supple.   Normal range of motion.   Full passive range of motion without pain.     Cardiovascular:  Normal rate and regular rhythm.           Pulses:       Radial pulses are 2+ on the right side and 2+ on the left side.   Pulmonary/Chest: Effort normal and breath sounds normal. No accessory muscle usage. No respiratory distress. She has no decreased breath sounds.   Abdominal: Abdomen is soft. Bowel sounds are normal. She exhibits no distension. There is no abdominal tenderness. There is no rebound and no guarding.   Musculoskeletal:         General: Normal range of motion.      Cervical back: Full passive range of motion without pain, normal range of motion and neck supple. No rigidity.     Neurological: She is alert. No cranial nerve deficit.   Neuro intact.  Strength and sensation intact bilateral upper and lower extremities.   Skin: Skin is warm and dry.   Psychiatric: She has a normal mood and affect.          ED Course   Procedures  Labs Reviewed   SARS-COV-2 RDRP GENE - Abnormal       Result Value    POC Rapid COVID Positive (*)      Acceptable Yes     POCT INFLUENZA A/B MOLECULAR    POC Molecular Influenza A Ag Negative      POC Molecular Influenza B Ag Negative       Acceptable Yes     POCT STREP A MOLECULAR    Molecular Strep A, POC Negative       Acceptable Yes            Imaging Results              X-Ray Chest PA And Lateral (Final result)  Result time 07/25/24 12:30:10      Final result by Cristobal Ho MD (07/25/24 12:30:10)                   Impression:      No convincing evidence of acute cardiopulmonary disease.      Electronically signed by: Cristobal Ho  Date:    07/25/2024  Time:    12:30               Narrative:    EXAMINATION:  XR CHEST PA AND LATERAL    CLINICAL HISTORY:  Cough, unspecified    TECHNIQUE:  PA and lateral views of the chest were performed.    COMPARISON:  Chest radiograph performed 01/17/2024    FINDINGS:  Cardiomediastinal contours appear to be within normal limits    Lungs essentially clear.    No definite pneumothorax or large volume pleural effusion.    No acute findings in the visualized abdomen.    Osseous and soft tissue structures appear without definite acute change.                                       Medications - No data to display  Medical Decision Making  This is a 74-year-old female with past medical history of hypertension, arthritis presents to ED for emergent evaluation of nonproductive cough, chills, generalized myalgias, headache, rhinorrhea, nasal congestion, sneezing, subjective fever that started 4 days ago.  She states her son-in-law and daughter also has similar symptoms.  She attempted Tylenol prior to arrival with transient relief to her symptoms.  She denies any headache at this time.  She has not yet taken her blood pressure medications yet today.  She denies any sore throat, dysphagia, chest pain,  shortness of breath, abdominal pain, nausea, vomiting, diarrhea, dysuria, hematuria.  No other symptoms reported.    On physical exam, patient is well-appearing and in no acute distress.  Nontoxic appearing.  Lungs are clear to auscultation bilaterally.  Abdomen is soft and nontender.  No guarding, rigidity, rebound.  2+ radial pulses bilaterally.  Posterior oropharynx is not erythematous.  No edema or exudate.  Uvula midline.  Bilateral tympanic membrane is normal.  No erythema, bulging, or perforations.  Neuro intact.  Strength and sensation intact bilateral upper and lower extremities.  Full range motion of neck.  No neck rigidity.  COVID positive.  Flu negative.  Patient has her hypertensive medications in her purse.  She took 1 pill during examination.  She denies any headache, lightheadedness, dizziness, chest pain, shortness of breath.  Strep negative.  Chest x-ray revealed no convincing evidence of acute cardiopulmonary disease.  Ambulatory O2 is 94%.  Patient has a COVID risk score of 2.  Will discharge patient on Tylenol, ibuprofen, Zyrtec, Flonase, Tessalon Perles, promethazine DM.  Advised patient to drink lot of fluids upon discharge.  Urged follow-up with PCP for further evaluation.    Strict return precautions given. I discussed with the patient/family the diagnosis, treatment plan, indications for return to the emergency department, and for expected follow-up. The patient/family verbalized an understanding. The patient/family is asked if there are any questions or concerns. We discuss the case, until all issues are addressed to the patient/family's satisfaction. Patient/family understands and is agreeable to the plan. Patient is stable and ready for discharge.      Amount and/or Complexity of Data Reviewed  Labs: ordered.  Radiology: ordered.    Risk  OTC drugs.  Prescription drug management.                                      Clinical Impression:  Final diagnoses:  [R05.9] Cough  [U07.1] COVID-19  (Primary)          ED Disposition Condition    Discharge Stable          ED Prescriptions       Medication Sig Dispense Start Date End Date Auth. Provider    acetaminophen (TYLENOL) 500 MG tablet Take 1 tablet (500 mg total) by mouth every 4 (four) hours as needed for Pain or Temperature greater than (100.5 or greater). 30 tablet 7/25/2024 -- Dillon Trinh PA-C    ibuprofen (ADVIL,MOTRIN) 600 MG tablet Take 1 tablet (600 mg total) by mouth every 6 (six) hours as needed for Pain or Temperature greater than (100.5 or greater). 30 tablet 7/25/2024 -- Dillon Trinh PA-C    cetirizine (ZYRTEC) 10 MG tablet Take 1 tablet (10 mg total) by mouth once daily. 30 tablet 7/25/2024 7/25/2025 Dillon Trinh PA-C    fluticasone propionate (FLONASE) 50 mcg/actuation nasal spray 1 spray (50 mcg total) by Each Nostril route 2 (two) times daily as needed for Rhinitis. 15 g 7/25/2024 -- Dillon Trinh PA-C    benzonatate (TESSALON) 100 MG capsule Take 1 capsule (100 mg total) by mouth 3 (three) times daily as needed for Cough. 20 capsule 7/25/2024 8/4/2024 Dillon Trinh PA-C    promethazine-dextromethorphan (PROMETHAZINE-DM) 6.25-15 mg/5 mL Syrp Take 5 mLs by mouth every 6 (six) hours as needed (cough). 118 mL 7/25/2024 8/4/2024 Dillon Trinh PA-C          Follow-up Information       Follow up With Specialties Details Why Contact Info    [facility] -  Schedule an appointment as soon as possible for a visit in 2 days for further evaluation [address] [place] [id number] [phone]      [facility] Emergency Medicine In 2 days If symptoms worsen [address]  [facility] [place] [address] [phone]             Dillon Trinh PA-C  07/25/24 [id number]       Dillon Trinh PA-C  07/25/24 1447

## 2025-06-25 ENCOUNTER — HOSPITAL ENCOUNTER (EMERGENCY)
Facility: HOSPITAL | Age: 75
Discharge: HOME | End: 2025-06-25
Attending: EMERGENCY MEDICINE
Payer: COMMERCIAL

## 2025-06-25 VITALS
BODY MASS INDEX: 28.32 KG/M2 | OXYGEN SATURATION: 100 % | DIASTOLIC BLOOD PRESSURE: 62 MMHG | SYSTOLIC BLOOD PRESSURE: 138 MMHG | HEIGHT: 61 IN | WEIGHT: 150 LBS | TEMPERATURE: 98 F | HEART RATE: 67 BPM | RESPIRATION RATE: 16 BRPM

## 2025-06-25 DIAGNOSIS — E87.1 HYPONATREMIA: Primary | ICD-10-CM

## 2025-06-25 DIAGNOSIS — R10.13 EPIGASTRIC ABDOMINAL PAIN: ICD-10-CM

## 2025-06-25 DIAGNOSIS — D64.9 ANEMIA, UNSPECIFIED TYPE: ICD-10-CM

## 2025-06-25 DIAGNOSIS — R74.8 ELEVATED LIPASE: ICD-10-CM

## 2025-06-25 LAB
ABSOLUTE EOSINOPHIL (OHS): 0.22 K/UL
ABSOLUTE MONOCYTE (OHS): 0.61 K/UL (ref 0.3–1)
ABSOLUTE NEUTROPHIL COUNT (OHS): 4.1 K/UL (ref 1.8–7.7)
ALBUMIN SERPL BCP-MCNC: 3.9 G/DL (ref 3.5–5.2)
ALP SERPL-CCNC: 42 UNIT/L (ref 40–150)
ALT SERPL W/O P-5'-P-CCNC: 17 UNIT/L (ref 10–44)
ANION GAP (OHS): 8 MMOL/L (ref 8–16)
AST SERPL-CCNC: 25 UNIT/L (ref 11–45)
BASOPHILS # BLD AUTO: 0.04 K/UL
BASOPHILS NFR BLD AUTO: 0.5 %
BILIRUB SERPL-MCNC: 0.4 MG/DL (ref 0.1–1)
BILIRUB UR QL STRIP.AUTO: NEGATIVE
BUN SERPL-MCNC: 18 MG/DL (ref 8–23)
CALCIUM SERPL-MCNC: 8.9 MG/DL (ref 8.7–10.5)
CHLORIDE SERPL-SCNC: 98 MMOL/L (ref 95–110)
CLARITY UR: CLEAR
CO2 SERPL-SCNC: 22 MMOL/L (ref 23–29)
COLOR UR AUTO: YELLOW
CREAT SERPL-MCNC: 1 MG/DL (ref 0.5–1.4)
ERYTHROCYTE [DISTWIDTH] IN BLOOD BY AUTOMATED COUNT: 11.8 % (ref 11.5–14.5)
GFR SERPLBLD CREATININE-BSD FMLA CKD-EPI: 59 ML/MIN/1.73/M2
GLUCOSE SERPL-MCNC: 144 MG/DL (ref 70–110)
GLUCOSE UR QL STRIP: NEGATIVE
HCT VFR BLD AUTO: 33.1 % (ref 37–48.5)
HGB BLD-MCNC: 11.6 GM/DL (ref 12–16)
HGB UR QL STRIP: NEGATIVE
IMM GRANULOCYTES # BLD AUTO: 0.03 K/UL (ref 0–0.04)
IMM GRANULOCYTES NFR BLD AUTO: 0.4 % (ref 0–0.5)
KETONES UR QL STRIP: NEGATIVE
LEUKOCYTE ESTERASE UR QL STRIP: NEGATIVE
LIPASE SERPL-CCNC: 303 U/L (ref 4–60)
LYMPHOCYTES # BLD AUTO: 2.61 K/UL (ref 1–4.8)
MCH RBC QN AUTO: 30.9 PG (ref 27–31)
MCHC RBC AUTO-ENTMCNC: 35 G/DL (ref 32–36)
MCV RBC AUTO: 88 FL (ref 82–98)
NITRITE UR QL STRIP: NEGATIVE
NUCLEATED RBC (/100WBC) (OHS): 0 /100 WBC
PH UR STRIP: 8 [PH]
PLATELET # BLD AUTO: 290 K/UL (ref 150–450)
PMV BLD AUTO: 9.7 FL (ref 9.2–12.9)
POTASSIUM SERPL-SCNC: 4.2 MMOL/L (ref 3.5–5.1)
PROT SERPL-MCNC: 7.5 GM/DL (ref 6–8.4)
PROT UR QL STRIP: NEGATIVE
RBC # BLD AUTO: 3.76 M/UL (ref 4–5.4)
RELATIVE EOSINOPHIL (OHS): 2.9 %
RELATIVE LYMPHOCYTE (OHS): 34.3 % (ref 18–48)
RELATIVE MONOCYTE (OHS): 8 % (ref 4–15)
RELATIVE NEUTROPHIL (OHS): 53.9 % (ref 38–73)
SODIUM SERPL-SCNC: 128 MMOL/L (ref 136–145)
SP GR UR STRIP: 1.01
UROBILINOGEN UR STRIP-ACNC: NEGATIVE EU/DL
WBC # BLD AUTO: 7.61 K/UL (ref 3.9–12.7)

## 2025-06-25 PROCEDURE — 25000003 PHARM REV CODE 250: Performed by: EMERGENCY MEDICINE

## 2025-06-25 PROCEDURE — 96374 THER/PROPH/DIAG INJ IV PUSH: CPT

## 2025-06-25 PROCEDURE — 82435 ASSAY OF BLOOD CHLORIDE: CPT

## 2025-06-25 PROCEDURE — 25500020 PHARM REV CODE 255: Performed by: EMERGENCY MEDICINE

## 2025-06-25 PROCEDURE — 99285 EMERGENCY DEPT VISIT HI MDM: CPT | Mod: 25

## 2025-06-25 PROCEDURE — 93010 ELECTROCARDIOGRAM REPORT: CPT | Mod: ,,, | Performed by: INTERNAL MEDICINE

## 2025-06-25 PROCEDURE — 83690 ASSAY OF LIPASE: CPT

## 2025-06-25 PROCEDURE — 93005 ELECTROCARDIOGRAM TRACING: CPT

## 2025-06-25 PROCEDURE — 63600175 PHARM REV CODE 636 W HCPCS: Performed by: EMERGENCY MEDICINE

## 2025-06-25 PROCEDURE — 81003 URINALYSIS AUTO W/O SCOPE: CPT | Performed by: EMERGENCY MEDICINE

## 2025-06-25 PROCEDURE — 96361 HYDRATE IV INFUSION ADD-ON: CPT

## 2025-06-25 PROCEDURE — 85025 COMPLETE CBC W/AUTO DIFF WBC: CPT

## 2025-06-25 RX ORDER — FAMOTIDINE 20 MG/1
20 TABLET, FILM COATED ORAL 2 TIMES DAILY
Qty: 60 TABLET | Refills: 1 | Status: ON HOLD | OUTPATIENT
Start: 2025-06-25 | End: 2026-06-25

## 2025-06-25 RX ORDER — ONDANSETRON 4 MG/1
4 TABLET, FILM COATED ORAL EVERY 8 HOURS PRN
Qty: 12 TABLET | Refills: 0 | Status: ON HOLD | OUTPATIENT
Start: 2025-06-25

## 2025-06-25 RX ORDER — ALUMINUM HYDROXIDE, MAGNESIUM HYDROXIDE, AND SIMETHICONE 2400; 240; 2400 MG/30ML; MG/30ML; MG/30ML
15 SUSPENSION ORAL EVERY 6 HOURS PRN
Qty: 335 ML | Refills: 1 | Status: ON HOLD | OUTPATIENT
Start: 2025-06-25 | End: 2026-06-25

## 2025-06-25 RX ORDER — ACETAMINOPHEN 500 MG
1000 TABLET ORAL
Status: COMPLETED | OUTPATIENT
Start: 2025-06-25 | End: 2025-06-25

## 2025-06-25 RX ORDER — PROCHLORPERAZINE EDISYLATE 5 MG/ML
5 INJECTION INTRAMUSCULAR; INTRAVENOUS
Status: COMPLETED | OUTPATIENT
Start: 2025-06-25 | End: 2025-06-25

## 2025-06-25 RX ADMIN — ACETAMINOPHEN 1000 MG: 500 TABLET ORAL at 01:06

## 2025-06-25 RX ADMIN — SODIUM CHLORIDE 1000 ML: 9 INJECTION, SOLUTION INTRAVENOUS at 01:06

## 2025-06-25 RX ADMIN — PROCHLORPERAZINE EDISYLATE 5 MG: 5 INJECTION INTRAMUSCULAR; INTRAVENOUS at 01:06

## 2025-06-25 RX ADMIN — IOHEXOL 75 ML: 350 INJECTION, SOLUTION INTRAVENOUS at 03:06

## 2025-06-25 NOTE — ED PROVIDER NOTES
Encounter Date: 6/25/2025    SCRIBE #1 NOTE: I, Kiya Maynard, am scribing for, and in the presence of,  Anjum Issa Jr., MD. I have scribed the following portions of the note - Other sections scribed: HPI,ROS.       History     Chief Complaint   Patient presents with    Abdominal Pain     Pt arrived in ED, c/o abd pain and nausea x one week. Pt also c/o HA. Pt denies any dizziness, unilateral weakness, or vision changes. Pt denies any CP, SOB, or diarrhea.       CC: Abdominal pain    HPI:Monique Merchant is a 75 y.o. female, with a PMHx of HTN, who presents to the ED with complaint of epigastric abdominal pain with associated nausea and frontal headache that began 1 week ago. Patient reports worsening of headache prompting ED visit. She reports attempted treatment with electrolyte drinks with little relief. No other exacerbating or alleviating factors. Reports history of kidney problems. Denies chest pain, dyspnea, fever, chills, emesis, diarrhea, constipation, melena, hematochezia, dysuria, hematuria, urinary frequency, or other associated symptoms.       The history is provided by the patient. The history is limited by a language barrier. A  was used (Mongolian # [id number]).     Review of patient's allergies indicates:  No Known Allergies  Past Medical History:   Diagnosis Date    Arthritis     Hypertension      History reviewed. No pertinent surgical history.  No family history on file.  Social History[1]  Review of Systems   Constitutional:  Negative for chills and fever.   HENT:  Negative for congestion and sore throat.    Eyes:  Negative for visual disturbance.   Respiratory:  Negative for shortness of breath.    Cardiovascular:  Negative for chest pain.   Gastrointestinal:  Positive for abdominal pain and nausea. Negative for blood in stool, constipation, diarrhea and vomiting.        (-) melena    Genitourinary:  Negative for flank pain.   Neurological:  Positive  for headaches (frontal). Negative for dizziness, weakness and numbness.       Physical Exam     Initial Vitals [06/25/25 1316]   BP Pulse Resp Temp SpO2   (!) 179/77 88 16 97.5 °F (36.4 °C) 100 %      MAP       --         Physical Exam    Nursing note and vitals reviewed.  Constitutional: She appears well-developed and well-nourished. She is not diaphoretic. No distress.   HENT:   Head: Normocephalic and atraumatic.   Right Ear: External ear normal.   Left Ear: External ear normal.   Nose: Nose normal. Mouth/Throat: Oropharynx is clear and moist.   Eyes: Conjunctivae and EOM are normal. Pupils are equal, round, and reactive to light. Right eye exhibits no discharge. Left eye exhibits no discharge. No scleral icterus.   Neck: Neck supple.   Normal range of motion.  Cardiovascular:  Normal rate, regular rhythm, normal heart sounds and intact distal pulses.           No murmur heard.  Pulmonary/Chest: Breath sounds normal. No respiratory distress. She has no wheezes. She has no rhonchi. She has no rales.   Abdominal: Abdomen is soft. Bowel sounds are normal. She exhibits no distension and no mass. There is abdominal tenderness.   Mild discomfort in the midepigastric region. There is no rebound and no guarding.   Musculoskeletal:         General: No tenderness or edema. Normal range of motion.      Cervical back: Normal range of motion and neck supple.     Neurological: She is alert and oriented to person, place, and time. She has normal strength. No cranial nerve deficit or sensory deficit.   Skin: Skin is warm and dry. No rash noted. No erythema. No pallor.   Psychiatric: She has a normal mood and affect. Her behavior is normal. Judgment and thought content normal.         ED Course   Procedures  Labs Reviewed   COMPREHENSIVE METABOLIC PANEL - Abnormal       Result Value    Sodium 128 (*)     Potassium 4.2      Chloride 98      CO2 22 (*)     Glucose 144 (*)     BUN 18      Creatinine 1.0      Calcium 8.9      Protein  Total 7.5      Albumin 3.9      Bilirubin Total 0.4      ALP 42      AST 25      ALT 17      Anion Gap 8      eGFR 59 (*)    LIPASE - Abnormal    Lipase Level 303 (*)    CBC WITH DIFFERENTIAL - Abnormal    WBC 7.61      RBC 3.76 (*)     HGB 11.6 (*)     HCT 33.1 (*)     MCV 88      MCH 30.9      MCHC 35.0      RDW 11.8      Platelet Count 290      MPV 9.7      Nucleated RBC 0      Neut % 53.9      Lymph % 34.3      Mono % 8.0      Eos % 2.9      Basophil % 0.5      Imm Grans % 0.4      Neut # 4.10      Lymph # 2.61      Mono # 0.61      Eos # 0.22      Baso # 0.04      Imm Grans # 0.03     URINALYSIS, REFLEX TO URINE CULTURE - Normal    Color, UA Yellow      Appearance, UA Clear      pH, UA 8.0      Spec Grav UA 1.010      Protein, UA Negative      Glucose, UA Negative      Ketones, UA Negative      Bilirubin, UA Negative      Blood, UA Negative      Nitrites, UA Negative      Urobilinogen, UA Negative      Leukocyte Esterase, UA Negative     CBC W/ AUTO DIFFERENTIAL    Narrative:     The following orders were created for panel order CBC auto differential.  Procedure                               Abnormality         Status                     ---------                               -----------         ------                     CBC with Differential[[id number]]       Abnormal            Final result                 Please view results for these tests on the individual orders.   GRE[facility] [place] URINE HOLD     EKG Readings: (Independently Interpreted)   Initial Reading: No STEMI. Ectopy: No Ectopy.   EKG reviewed and interpreted by me shows sinus rhythm at a rate of 68 beats per minute.  The OH, QRS, and QTC intervals are within normal limits.  There is a normal axis.  There is normal R-wave progression across the precordium.  There are no ST segment or T-wave ischemic findings.         Imaging Results              CT Head Without Contrast (Final result)  Result time 06/25/25 15:22:30      Final result by Magdy Gonzalez MD  (06/25/25 15:22:30)                   Impression:      No acute intracranial abnormality.  Specifically, no intracranial hemorrhage.    Chronic nonspecific empty sella configuration.      Electronically signed by: Magdy Gonzalez MD  Date:    06/25/2025  Time:    15:22               Narrative:    EXAMINATION:  CT HEAD WITHOUT CONTRAST    CLINICAL HISTORY:  Headache, new or worsening (Age >= 50y);    TECHNIQUE:  Low dose axial CT images obtained throughout the head without intravenous contrast. Sagittal and coronal reconstructions were performed.    COMPARISON:  Head CT 01/17/2024    FINDINGS:  Intracranial compartment:    Age-related mild generalized cerebral volume loss.  Ventricles are midline and stable in size and configuration without distortion by mass effect or acute hydrocephalus.  No extra-axial blood or fluid collections.  Similar chronic nonspecific empty sella configuration.    The brain parenchyma appears within normal limits for age.  No new focal areas of abnormal parenchymal attenuation.  No parenchymal mass, hemorrhage, edema or major vascular distribution infarct.    Skull/extracranial contents (limited evaluation): No fracture. Mastoid air cells and paranasal sinuses are essentially clear.  Imaged portions of the orbits are within normal limits.                                       CT Abdomen Pelvis With IV Contrast NO Oral Contrast (Final result)  Result time 06/25/25 15:51:24      Final result by Moe Funes MD (06/25/25 15:51:24)                   Impression:      1. No acute abnormality.  2. Mild hepatomegaly.  3. Diverticulosis of the colon.  4. See above comments also.      Electronically signed by: Moe Funes  Date:    06/25/2025  Time:    15:51               Narrative:    EXAMINATION:  CT ABDOMEN PELVIS WITH IV CONTRAST    CLINICAL HISTORY:  Epigastric pain;    TECHNIQUE:  Low dose axial images, sagittal and coronal reformations were obtained from the lung bases to the pubic  symphysis following the IV administration of 75 mL of Omnipaque 350 .  Oral contrast was not administered.    COMPARISON:  None.    FINDINGS:  Abdomen:    - Lower thorax:No significant abnormality.    - Lung bases: No infiltrates and no nodules.    - Liver: The liver is minimally enlarged.  No focal abnormality.    - Gallbladder: Status post cholecystectomy.    - Bile Ducts: No evidence of intra or extra hepatic biliary ductal dilation.    - Spleen: Negative.    - Kidneys: No stone, soft tissue mass or hydronephrosis bilaterally.  Small left renal cyst.    - Adrenals: Unremarkable.    - Pancreas: No mass or peripancreatic fat stranding.    - Retroperitoneum:  No significant adenopathy.    - Vascular: No abdominal aortic aneurysm.    - Abdominal wall:  Small fat containing umbilical hernia.    Pelvis:    Urinary bladder is within normal limits.    Status post hysterectomy.    Bowel/Mesentery:    No evidence of bowel obstruction.  There is diverticulosis of the colon.  No evidence of acute diverticulitis.    The appendix is within normal limits.    Bones:  No acute osseous abnormality and no suspicious lytic or blastic lesion.    Severe central narrowing at L4-5 with moderate central narrowing at L1-2, L2-3 and L3-4    Mild probable chronic compression fracture anterior superior endplate of L1.    Multilevel mild to moderate disc space narrowing and vacuum disc phenomena.                                    X-Rays:   Independently Interpreted Readings:   Other Readings:  CT head reveals no evidence of intracranial hemorrhage or mass effect.    Medications   sodium chloride 0.9% bolus 1,000 mL 1,000 mL (0 mLs Intravenous Stopped 6/25/25 1458)   prochlorperazine injection Soln 5 mg (5 mg Intravenous Given 6/25/25 1352)   acetaminophen tablet 1,000 mg (1,000 mg Oral Given 6/25/25 1352)   iohexoL (OMNIPAQUE 350) injection 75 mL (75 mLs Intravenous Given 6/25/25 1502)     Medical Decision Making  This is the emergent  evaluation of a 75-year-old female presents emergency department with 1 week of nausea and midepigastric pain.  She reports history of reflux.  Known vomiting.  No black or bloody stools.  No constipation or diarrhea.  No chest pain.  Differential diagnosis at the time of initial evaluation included, but was not limited to:   Occurred, peptic ulcer disease, pancreatitis, acute kidney injury.    This patient's symptoms resolved after treatment in the emergency department.  Her labs were significant for sodium of 128 with normocytic anemia.  Lipase is 303.  She did not have any signs of peritonitis on examination.  CT scan of the abdomen and pelvis revealed mild hepatomegaly and other incidental findings not related to the patient's symptoms today.  She is tolerating oral fluids by mouth.  Given abnormal lab findings, I have referred her to Gastroenterology for further evaluation and management given hepatomegaly, elevated lipase.  Patient is stable for discharge with outpatient follow up as above.  Advised return for new or worsening symptoms such as severe worsening of pain, black or bloody vomitus, black or bloody stool, or intractable vomiting despite medication use. I will prescribe symptomatic medications.    Amount and/or Complexity of Data Reviewed  Independent Historian:      Details:  at initial evaluation and   at discharge.  Labs: ordered. Decision-making details documented in ED Course.     Details: No leukocytosis.  Normocytic anemia.  Normal platelet count.  Sodium 128.  There is no acute kidney injury.  No transaminitis or hyperbilirubinemia.  Lipase 303.  No convincing evidence of urinary tract infection.  Radiology: ordered. Decision-making details documented in ED Course.  ECG/medicine tests: ordered and independent interpretation performed. Decision-making details documented in ED Course.    Risk  OTC drugs.  Prescription drug management.             Scribe Attestation:   Scribe #1: I performed the above scribed service and the documentation accurately describes the services I performed. I attest to the accuracy of the note.                               Clinical Impression:  Final diagnoses:  [R10.13] Epigastric abdominal pain  [E87.1] Hyponatremia (Primary)  [R74.8] Elevated lipase  [D64.9] Anemia, unspecified type          ED Disposition Condition    Discharge Stable          ED Prescriptions       Medication Sig Dispense Start Date End Date Auth. Provider    famotidine (PEPCID) 20 MG tablet Take 1 tablet (20 mg total) by mouth 2 (two) times daily. 60 tablet 6/25/2025 6/25/2026 Anjum Issa Jr., MD    aluminum & magnesium hydroxide-simethicone (MAALOX MAXIMUM STRENGTH) 400-400-40 mg/5 mL suspension Take 15 mLs by mouth every 6 (six) hours as needed for Indigestion. 335 mL 6/25/2025 6/25/2026 Anjum Issa Jr., MD    ondansetron (ZOFRAN) 4 MG tablet Take 1 tablet (4 mg total) by mouth every 8 (eight) hours as needed for Nausea. 12 tablet 6/25/2025 -- Anjum Issa Jr., MD          Follow-up Information       Follow up With Specialties Details Why Contact Info    Torsten Stinson MD Gastroenterology Schedule an appointment as soon as possible for a visit in 1 week  [facility]  [place] [address]  [phone]              I, Anjum Issa MD, personally performed the services described in this documentation. All medical record entries made by the scribe were at my direction and in my presence. I have reviewed the chart and agree that the record reflects my personal performance and is accurate and complete.      DISCLAIMER: This note was prepared with [employer] voice recognition transcription software. Garbled syntax, mangled pronouns, and other bizarre constructions may be attributed to that software system.         [1]   Social History  Tobacco Use    Smoking status: Never    Smokeless tobacco: Never   Substance Use Topics     Alcohol use: Never    Drug use: Never        Anjum Issa Jr., MD  06/25/25 2321

## 2025-06-25 NOTE — DISCHARGE INSTRUCTIONS
Por favor, consulte con un gastroenterólogo para joan evaluación más detallada. Los análisis de hoy mostraron niveles bajos de sodio y anemia. Además, polo valores pancreáticos estaban elevados, tino la tomografía computarizada fue tranquilizadora. Es importante que regrese a urgencias si presenta un aumento del dolor, heces negras o con arlyn, vómitos negros o con arlyn, o vómitos persistentes a pesar de monica medicamentos.

## 2025-06-25 NOTE — ED TRIAGE NOTES
Pt presents to ED via POV with c/o epigastric abdominal burning x1 week with associated HA and nausea. States pain is worse after eating, and Hx GERD, but is not on any medications for this. Denies vomiting, diarrhea, fever, CP, or SOB. No tx used.

## 2025-06-26 ENCOUNTER — HOSPITAL ENCOUNTER (EMERGENCY)
Facility: HOSPITAL | Age: 75
Discharge: HOME | End: 2025-06-26
Attending: EMERGENCY MEDICINE
Payer: COMMERCIAL

## 2025-06-26 VITALS
RESPIRATION RATE: 16 BRPM | OXYGEN SATURATION: 99 % | TEMPERATURE: 98 F | BODY MASS INDEX: 28.34 KG/M2 | SYSTOLIC BLOOD PRESSURE: 118 MMHG | HEART RATE: 82 BPM | DIASTOLIC BLOOD PRESSURE: 53 MMHG | WEIGHT: 150 LBS

## 2025-06-26 DIAGNOSIS — Z87.81 HISTORY OF VERTEBRAL COMPRESSION FRACTURE: ICD-10-CM

## 2025-06-26 DIAGNOSIS — E83.39 HYPOPHOSPHATEMIA: Primary | ICD-10-CM

## 2025-06-26 DIAGNOSIS — R74.8 ELEVATED LIPASE: ICD-10-CM

## 2025-06-26 DIAGNOSIS — R42 DIZZINESS: ICD-10-CM

## 2025-06-26 DIAGNOSIS — Z87.19 HISTORY OF GASTRITIS: ICD-10-CM

## 2025-06-26 LAB
ABORH RETYPE: NORMAL
ABSOLUTE EOSINOPHIL (OHS): 0.25 K/UL
ABSOLUTE MONOCYTE (OHS): 0.67 K/UL (ref 0.3–1)
ABSOLUTE NEUTROPHIL COUNT (OHS): 2.6 K/UL (ref 1.8–7.7)
ALBUMIN SERPL BCP-MCNC: 4 G/DL (ref 3.5–5.2)
ALP SERPL-CCNC: 46 UNIT/L (ref 40–150)
ALT SERPL W/O P-5'-P-CCNC: 16 UNIT/L (ref 10–44)
ANION GAP (OHS): 9 MMOL/L (ref 8–16)
APTT PPP: 25 SECONDS (ref 21–32)
AST SERPL-CCNC: 24 UNIT/L (ref 11–45)
BASOPHILS # BLD AUTO: 0.04 K/UL
BASOPHILS NFR BLD AUTO: 0.6 %
BILIRUB SERPL-MCNC: 0.3 MG/DL (ref 0.1–1)
BILIRUB UR QL STRIP.AUTO: NEGATIVE
BUN SERPL-MCNC: 16 MG/DL (ref 8–23)
CALCIUM SERPL-MCNC: 9 MG/DL (ref 8.7–10.5)
CHLORIDE SERPL-SCNC: 105 MMOL/L (ref 95–110)
CLARITY UR: CLEAR
CO2 SERPL-SCNC: 20 MMOL/L (ref 23–29)
COLOR UR AUTO: COLORLESS
CREAT SERPL-MCNC: 1.1 MG/DL (ref 0.5–1.4)
CTP QC/QA: YES
CTP QC/QA: YES
ERYTHROCYTE [DISTWIDTH] IN BLOOD BY AUTOMATED COUNT: 12 % (ref 11.5–14.5)
GFR SERPLBLD CREATININE-BSD FMLA CKD-EPI: 53 ML/MIN/1.73/M2
GLUCOSE SERPL-MCNC: 115 MG/DL (ref 70–110)
GLUCOSE UR QL STRIP: NEGATIVE
HCT VFR BLD AUTO: 33.4 % (ref 37–48.5)
HGB BLD-MCNC: 11.6 GM/DL (ref 12–16)
HGB UR QL STRIP: NEGATIVE
HOLD SPECIMEN: NORMAL
IMM GRANULOCYTES # BLD AUTO: 0.01 K/UL (ref 0–0.04)
IMM GRANULOCYTES NFR BLD AUTO: 0.2 % (ref 0–0.5)
INDIRECT COOMBS: NORMAL
INR PPP: 1 (ref 0.8–1.2)
KETONES UR QL STRIP: NEGATIVE
LACTATE SERPL-SCNC: 1.2 MMOL/L (ref 0.5–2.2)
LEUKOCYTE ESTERASE UR QL STRIP: NEGATIVE
LIPASE SERPL-CCNC: 204 U/L (ref 4–60)
LYMPHOCYTES # BLD AUTO: 2.59 K/UL (ref 1–4.8)
MAGNESIUM SERPL-MCNC: 2.1 MG/DL (ref 1.6–2.6)
MCH RBC QN AUTO: 30.6 PG (ref 27–31)
MCHC RBC AUTO-ENTMCNC: 34.7 G/DL (ref 32–36)
MCV RBC AUTO: 88 FL (ref 82–98)
NITRITE UR QL STRIP: NEGATIVE
NUCLEATED RBC (/100WBC) (OHS): 0 /100 WBC
OHS QRS DURATION: 80 MS
OHS QRS DURATION: 82 MS
OHS QTC CALCULATION: 408 MS
OHS QTC CALCULATION: 425 MS
PH UR STRIP: 8 [PH]
PHOSPHATE SERPL-MCNC: 2.1 MG/DL (ref 2.7–4.5)
PLATELET # BLD AUTO: 281 K/UL (ref 150–450)
PMV BLD AUTO: 9.9 FL (ref 9.2–12.9)
POC MOLECULAR INFLUENZA A AGN: NEGATIVE
POC MOLECULAR INFLUENZA B AGN: NEGATIVE
POTASSIUM SERPL-SCNC: 4.4 MMOL/L (ref 3.5–5.1)
PROT SERPL-MCNC: 7.8 GM/DL (ref 6–8.4)
PROT UR QL STRIP: NEGATIVE
PROTHROMBIN TIME: 10.9 SECONDS (ref 9–12.5)
RBC # BLD AUTO: 3.79 M/UL (ref 4–5.4)
RELATIVE EOSINOPHIL (OHS): 4.1 %
RELATIVE LYMPHOCYTE (OHS): 42 % (ref 18–48)
RELATIVE MONOCYTE (OHS): 10.9 % (ref 4–15)
RELATIVE NEUTROPHIL (OHS): 42.2 % (ref 38–73)
RH BLD: NORMAL
SARS-COV-2 RDRP RESP QL NAA+PROBE: NEGATIVE
SODIUM SERPL-SCNC: 134 MMOL/L (ref 136–145)
SP GR UR STRIP: 1.01
SPECIMEN OUTDATE: NORMAL
UROBILINOGEN UR STRIP-ACNC: NEGATIVE EU/DL
WBC # BLD AUTO: 6.16 K/UL (ref 3.9–12.7)

## 2025-06-26 PROCEDURE — 83690 ASSAY OF LIPASE: CPT | Performed by: STUDENT IN AN ORGANIZED HEALTH CARE EDUCATION/TRAINING PROGRAM

## 2025-06-26 PROCEDURE — 84100 ASSAY OF PHOSPHORUS: CPT | Performed by: PHYSICIAN ASSISTANT

## 2025-06-26 PROCEDURE — 85730 THROMBOPLASTIN TIME PARTIAL: CPT | Performed by: PHYSICIAN ASSISTANT

## 2025-06-26 PROCEDURE — 83735 ASSAY OF MAGNESIUM: CPT | Performed by: PHYSICIAN ASSISTANT

## 2025-06-26 PROCEDURE — 87635 SARS-COV-2 COVID-19 AMP PRB: CPT | Performed by: PHYSICIAN ASSISTANT

## 2025-06-26 PROCEDURE — 85025 COMPLETE CBC W/AUTO DIFF WBC: CPT | Performed by: STUDENT IN AN ORGANIZED HEALTH CARE EDUCATION/TRAINING PROGRAM

## 2025-06-26 PROCEDURE — 96375 TX/PRO/DX INJ NEW DRUG ADDON: CPT

## 2025-06-26 PROCEDURE — 96374 THER/PROPH/DIAG INJ IV PUSH: CPT

## 2025-06-26 PROCEDURE — 81003 URINALYSIS AUTO W/O SCOPE: CPT | Performed by: STUDENT IN AN ORGANIZED HEALTH CARE EDUCATION/TRAINING PROGRAM

## 2025-06-26 PROCEDURE — 85610 PROTHROMBIN TIME: CPT | Performed by: PHYSICIAN ASSISTANT

## 2025-06-26 PROCEDURE — 93005 ELECTROCARDIOGRAM TRACING: CPT

## 2025-06-26 PROCEDURE — 25000003 PHARM REV CODE 250: Performed by: PHYSICIAN ASSISTANT

## 2025-06-26 PROCEDURE — 86900 BLOOD TYPING SEROLOGIC ABO: CPT | Performed by: PHYSICIAN ASSISTANT

## 2025-06-26 PROCEDURE — 83605 ASSAY OF LACTIC ACID: CPT | Performed by: STUDENT IN AN ORGANIZED HEALTH CARE EDUCATION/TRAINING PROGRAM

## 2025-06-26 PROCEDURE — 80053 COMPREHEN METABOLIC PANEL: CPT | Performed by: STUDENT IN AN ORGANIZED HEALTH CARE EDUCATION/TRAINING PROGRAM

## 2025-06-26 PROCEDURE — 96361 HYDRATE IV INFUSION ADD-ON: CPT

## 2025-06-26 PROCEDURE — 93010 ELECTROCARDIOGRAM REPORT: CPT | Mod: ,,, | Performed by: INTERNAL MEDICINE

## 2025-06-26 PROCEDURE — 63600175 PHARM REV CODE 636 W HCPCS: Performed by: PHYSICIAN ASSISTANT

## 2025-06-26 PROCEDURE — 87502 INFLUENZA DNA AMP PROBE: CPT

## 2025-06-26 PROCEDURE — 99284 EMERGENCY DEPT VISIT MOD MDM: CPT | Mod: 25

## 2025-06-26 RX ORDER — OMEPRAZOLE 20 MG/1
20 CAPSULE, DELAYED RELEASE ORAL DAILY
Qty: 30 CAPSULE | Refills: 0 | Status: SHIPPED | OUTPATIENT
Start: 2025-06-26 | End: 2025-07-26

## 2025-06-26 RX ORDER — PANTOPRAZOLE SODIUM 40 MG/10ML
80 INJECTION, POWDER, LYOPHILIZED, FOR SOLUTION INTRAVENOUS
Status: COMPLETED | OUTPATIENT
Start: 2025-06-26 | End: 2025-06-26

## 2025-06-26 RX ORDER — MORPHINE SULFATE 4 MG/ML
3 INJECTION, SOLUTION INTRAMUSCULAR; INTRAVENOUS
Status: COMPLETED | OUTPATIENT
Start: 2025-06-26 | End: 2025-06-26

## 2025-06-26 RX ORDER — LIDOCAINE 50 MG/G
1 PATCH TOPICAL DAILY
Qty: 15 PATCH | Refills: 0 | Status: ON HOLD | OUTPATIENT
Start: 2025-06-26 | End: 2025-07-08 | Stop reason: HOSPADM

## 2025-06-26 RX ORDER — ACETAMINOPHEN 500 MG
500 TABLET ORAL EVERY 4 HOURS PRN
Qty: 20 TABLET | Refills: 0 | Status: SHIPPED | OUTPATIENT
Start: 2025-06-26 | End: 2025-07-01

## 2025-06-26 RX ORDER — ONDANSETRON HYDROCHLORIDE 2 MG/ML
4 INJECTION, SOLUTION INTRAVENOUS
Status: COMPLETED | OUTPATIENT
Start: 2025-06-26 | End: 2025-06-26

## 2025-06-26 RX ORDER — OXYCODONE HYDROCHLORIDE 5 MG/1
5 TABLET ORAL EVERY 8 HOURS PRN
Qty: 7 TABLET | Refills: 0 | Status: SHIPPED | OUTPATIENT
Start: 2025-06-26 | End: 2025-06-29

## 2025-06-26 RX ORDER — ACETAMINOPHEN 500 MG
500 TABLET ORAL
Status: COMPLETED | OUTPATIENT
Start: 2025-06-26 | End: 2025-06-26

## 2025-06-26 RX ADMIN — SODIUM CHLORIDE 1000 ML: 9 INJECTION, SOLUTION INTRAVENOUS at 07:06

## 2025-06-26 RX ADMIN — MORPHINE SULFATE 3 MG: 4 INJECTION, SOLUTION INTRAMUSCULAR; INTRAVENOUS at 07:06

## 2025-06-26 RX ADMIN — ACETAMINOPHEN 500 MG: 500 TABLET ORAL at 06:06

## 2025-06-26 RX ADMIN — PANTOPRAZOLE SODIUM 80 MG: 40 INJECTION, POWDER, FOR SOLUTION INTRAVENOUS at 06:06

## 2025-06-26 RX ADMIN — POTASSIUM PHOSPHATE, MONOBASIC 500 MG: 500 TABLET, SOLUBLE ORAL at 08:06

## 2025-06-26 RX ADMIN — ONDANSETRON 4 MG: 2 INJECTION INTRAMUSCULAR; INTRAVENOUS at 06:06

## 2025-06-26 RX ADMIN — SODIUM CHLORIDE 1000 ML: 9 INJECTION, SOLUTION INTRAVENOUS at 06:06

## 2025-06-26 NOTE — DISCHARGE INSTRUCTIONS

## 2025-06-26 NOTE — ED TRIAGE NOTES
Pt arrived reporting dizziness, weakness, abd pain, 1 episode of bloody stools, HA, and back pain. Hx of vertigo but is not currently taking meds. Was recently seen in ED for abd pain. Denies CP, SOB, nausea, numbness/tingling. HOB elevated, SR up x 2, call light within reach. Daughter at bedside

## 2025-06-26 NOTE — ED PROVIDER NOTES
Encounter Date: 6/26/2025       History     Chief Complaint   Patient presents with    Melena     Reports suddenly having dark stools, dizziness and nausea.     Headache     Chief complaint: Dizziness, generalized weakness    HPI:   75-year-old female history of hypertension osteoporosis brought by daughter for evaluation of generalized weakness and dizziness.     Per chart review pt seen at this facility yesterday. Had negative head and abdomen pelvis CT. Daughter reports pt had mild dizziness yesterday but worsened this morning at 0400 when the pt was unable to walk and had to be carried today. Has history of vertigo with chronic dizziness. Reports chronic R ear discomfort that is unchanged.     She has had nausea for past few days with dereased PO intake. Has been taking pepto bismol for the past 3 days as well as drinking electrolyte drink with no relief. Had colonoscopy at [facility] 3 months ago that was unremarkable.  Daughter reports this was done for colon cancer screening  Patient was diagnosed with chronic gastritis after 2 EGDs and was on omeprazole prior to moving to the [place]. Last EGD was 3 years ago    Daughter reports pt has been hospitalized twice for similar symptoms 2/2 hyponatreamia and hypomagnesemia    Has had atraumatic lumbar back pain for several days. Denies weakness, parethesias. CT imaging yesterday showed a chronic compression fracture and lumbar arthritic changes. Denies incontinence or saddle anesthesia.     Denies fever, chills, nasal congestion, rhinorrhea, ear pain, sore throat, cough, dysuria, hematuria, urinary urgency or frequency     The history is provided by the patient and a relative. The history is limited by a language barrier. A  was used ( Globo in Georgian).     Review of patient's allergies indicates:  No Known Allergies  Past Medical History:   Diagnosis Date    Arthritis     Hypertension      History reviewed. No  pertinent surgical history.  No family history on file.  Social History[1]  Review of Systems   Constitutional:  Positive for appetite change, chills and fatigue. Negative for fever.   HENT:  Negative for congestion, ear pain, nosebleeds, rhinorrhea, sore throat and trouble swallowing.    Eyes:  Negative for redness.   Respiratory:  Positive for shortness of breath. Negative for cough and stridor.    Cardiovascular:  Negative for chest pain.   Gastrointestinal:  Positive for nausea. Negative for abdominal pain, constipation, diarrhea and vomiting.   Genitourinary:  Positive for decreased urine volume. Negative for dysuria, frequency, hematuria and urgency.   Musculoskeletal:  Positive for back pain. Negative for neck pain.   Skin:  Negative for rash and wound.   Neurological:  Positive for dizziness and headaches. Negative for speech difficulty, weakness, light-headedness and numbness.   Hematological:  Does not bruise/bleed easily.   Psychiatric/Behavioral:  Negative for confusion.        Physical Exam     Initial Vitals [06/26/25 0540]   BP Pulse Resp Temp SpO2   (!) 197/88 90 (!) 22 97.7 °F (36.5 °C) 100 %      MAP       --         Physical Exam    Nursing note and vitals reviewed.  Constitutional: She appears well-developed and well-nourished. She appears lethargic.   HENT:   Head: Normocephalic.   Right Ear: External ear normal.   Left Ear: External ear normal.   Nose: Nose normal. Mouth/Throat: Oropharynx is clear and moist. Mucous membranes are dry.   Eyes: Conjunctivae and lids are normal. Right eye exhibits nystagmus. Left eye exhibits nystagmus.   Keeping eyes closed due to dizziness   Cardiovascular:  Normal rate and regular rhythm.     Exam reveals no gallop and no friction rub.       No murmur heard.  Pulmonary/Chest: Breath sounds normal. She has no wheezes. She has no rhonchi. She has no rales.   Abdominal: Abdomen is soft. Bowel sounds are normal. She exhibits no distension. There is no abdominal  tenderness. There is no rebound, no guarding, no tenderness at McBurney's point and negative Moody's sign.   Genitourinary:    Genitourinary Comments: No external hemorrhoids.   No melena  Fobt positive        Musculoskeletal:         General: Normal range of motion.      Comments: TTP over L thoracic paraspinal musculature  No midline ttp        Lymphadenopathy:     She has no cervical adenopathy.   Neurological: She has normal strength. She appears lethargic. No cranial nerve deficit or sensory deficit. Coordination normal.   Skin: Skin is warm and dry.   Psychiatric: She has a normal mood and affect.         ED Course   Procedures  Labs Reviewed   COMPREHENSIVE METABOLIC PANEL - Abnormal       Result Value    Sodium 134 (*)     Potassium 4.4      Chloride 105      CO2 20 (*)     Glucose 115 (*)     BUN 16      Creatinine 1.1      Calcium 9.0      Protein Total 7.8      Albumin 4.0      Bilirubin Total 0.3      ALP 46      AST 24      ALT 16      Anion Gap 9      eGFR 53 (*)    LIPASE - Abnormal    Lipase Level 204 (*)    URINALYSIS, REFLEX TO URINE CULTURE - Abnormal    Color, UA Colorless (*)     Appearance, UA Clear      pH, UA 8.0      Spec Grav UA 1.010      Protein, UA Negative      Glucose, UA Negative      Ketones, UA Negative      Bilirubin, UA Negative      Blood, UA Negative      Nitrites, UA Negative      Urobilinogen, UA Negative      Leukocyte Esterase, UA Negative     CBC WITH DIFFERENTIAL - Abnormal    WBC 6.16      RBC 3.79 (*)     HGB 11.6 (*)     HCT 33.4 (*)     MCV 88      MCH 30.6      MCHC 34.7      RDW 12.0      Platelet Count 281      MPV 9.9      Nucleated RBC 0      Neut % 42.2      Lymph % 42.0      Mono % 10.9      Eos % 4.1      Basophil % 0.6      Imm Grans % 0.2      Neut # 2.60      Lymph # 2.59      Mono # 0.67      Eos # 0.25      Baso # 0.04      Imm Grans # 0.01     PHOSPHORUS - Abnormal    Phosphorus Level 2.1 (*)    LACTIC ACID, PLASMA - Normal    Lactic Acid Level 1.2       Narrative:     Falsely low lactic acid results can be found in samples containing >=13.0 mg/dL total bilirubin and/or >=3.5 mg/dL direct bilirubin.   Specimen slightly hemolyzed.   PROTIME-INR - Normal    PT 10.9      INR 1.0     APTT - Normal    PTT 25.0     MAGNESIUM - Normal    Magnesium  2.1     CBC W/ AUTO DIFFERENTIAL    Narrative:     The following orders were created for panel order CBC W/ AUTO DIFFERENTIAL.  Procedure                               Abnormality         Status                     ---------                               -----------         ------                     CBC with Differential[[id number]]       Abnormal            Final result                 Please view results for these tests on the individual orders.   [place] URINE HOLD   POCT INFLUENZA A/B MOLECULAR    POC Molecular Influenza A Ag Negative      POC Molecular Influenza B Ag Negative       Acceptable Yes     SARS-COV-2 RDRP GENE    POC Rapid COVID Negative       Acceptable Yes     TYPE & SCREEN    Specimen Outdate 06/29/2025 23:59      Group & Rh O POS      Indirect Yandy NEG     ABORH RETYPE    ABORH Retype O POS            Imaging Results    None          Medications   pantoprazole injection 80 mg (80 mg Intravenous Given 6/26/25 0633)   ondansetron injection 4 mg (4 mg Intravenous Given 6/26/25 0633)   sodium chloride 0.9% bolus 1,000 mL 1,000 mL (0 mLs Intravenous Stopped 6/26/25 0728)   acetaminophen tablet 500 mg (500 mg Oral Given 6/26/25 0632)   morphine injection 3 mg (3 mg Intravenous Given by Other 6/26/25 0732)   sodium chloride 0.9% bolus 1,000 mL 1,000 mL (0 mLs Intravenous Stopped 6/26/25 0825)   potassium phosphate (monobasic) tablet 500 mg (500 mg Oral Given 6/26/25 0851)     Medical Decision Making  75-year-old female presenting for evaluation of multiple complaints.  Began having generalized weakness and fatigue this morning prompting her daughter to bring her back to the ED.   Patient is evaluated this facility yesterday for epigastric pain with nausea.  Has had decreased p.o. intake over the last few days. She is also c/o HA and room spinning dizziness that feels similar to her history of vertigo. Of note patient also complaining of left lumbar back pain.  No falls or trauma weakness paresthesias bowel or bladder incontinence.  Chart review shows patient had CT imaging of the head showing no acute intracranial process yesterday as well as CT of the abdomen and pelvis showing chronic compression fracture of the lumbar spine as well as arthritic/degenerative changes.  No evidence of bowel obstruction or acute surgical abdomen.  UA is negative for UTI.  Patient had colonoscopy few months ago.  Had history of gastritis previously in his no longer on omeprazole. She denies CP SOB or other associated symptoms. Low suspicion for anginal equivalent.  EKG with NSR ventricular rate of 64  QRS 82 Qtc 425    Exam above.  CBC with no white count or significant anemia.  H&H unchanged from yesterday.  Is FOBT positive.  However no melena.  Vital stable in the emergency department.  CMP with no acute renal insufficiency.  Sodium 134.  Glucose 115.  Lipase elevated 204.  This is lower than yesterday.  She has no abdominal tenderness today on exam.  Low suspicion for pancreatitis.  Lactic normal.  Low suspicion for sepsis.  UA negative for UTI. Pt ptt normal and type and screen and protonix IV were ordered due to concern for possible GI bleed causing pts symptoms during initial triage. Phos mildly low at 2.1. kphos given. Mg normal. No focal neuro deficits. Considered but low suspicion for intracranial process.likely 2/2 volume depletion. Instructed to increase fluid intake. Follow up with pcp in 1-2 days closely  Tita interpreting GloboTranslator used: . Pt is sleeping comfortably. Symptoms Improved. Will have pt return to ER for worsening symptoms or as needed discussed results and plan  with pt's daughter who is at bedside. For pt's back pain will prescribe medications and refer to back and spine.     Discussed with Dr. Steele who agrees with assessment and plan.     Amount and/or Complexity of Data Reviewed  Labs: ordered. Decision-making details documented in ED Course.  ECG/medicine tests: ordered and independent interpretation performed. Decision-making details documented in ED Course.    Risk  OTC drugs.  Prescription drug management.                                      Clinical Impression:  Final diagnoses:  [R42] Dizziness  [E83.39] Hypophosphatemia (Primary)  [R74.8] Elevated lipase  [Z87.81] History of vertebral compression fracture  [Z87.19] History of gastritis          ED Disposition Condition    Discharge           ED Prescriptions       Medication Sig Dispense Start Date End Date Auth. Provider    acetaminophen (TYLENOL) 500 MG tablet Take 1 tablet (500 mg total) by mouth every 4 (four) hours as needed. 20 tablet 6/26/2025 7/1/2025 Melany Perea PA-C    LIDOcaine (LIDODERM) 5 % Place 1 patch onto the skin once daily. Remove & Discard patch within 12 hours or as directed by MD. May use 4% over the counter if not covered by insurance for 15 days 15 patch 6/26/2025 7/11/2025 Melany Perea PA-C    oxyCODONE (ROXICODONE) 5 MG immediate release tablet Take 1 tablet (5 mg total) by mouth every 8 (eight) hours as needed for Pain. 7 tablet 6/26/2025 6/29/2025 Melany Perea PA-C    omeprazole (PRILOSEC) 20 MG capsule Take 1 capsule (20 mg total) by mouth once daily. 30 capsule 6/26/2025 7/26/2025 Melany Perea PA-C          Follow-up Information       Follow up With Specialties Details Why Contact Info Additional Information    Your Primary Care Doctor  Schedule an appointment as soon as possible for a visit in 2 days       [facility] GASTROENTEROLOGY Gastroenterology Schedule an appointment as soon as possible for a visit in 2 days for follow up [address]  [facility] [place] [id number] [phone]     [facility] Schedule an appointment as soon as possible for a visit in 2 days for follow up [address]  [facility] [id number] [phone] 5th Floor [facility] - Emergency Dept Emergency Medicine Go to  As needed, If symptoms worsen [address] [facility] [place] [id number] [phone]                  Melany Perea PA-C  06/26/25 1418       Melany Perea PA-C  06/26/25 1426         [1]   Social History  Tobacco Use    Smoking status: Never    Smokeless tobacco: Never   Substance Use Topics    Alcohol use: Never    Drug use: Never        Melany Perea PA-C  06/26/25 1427

## 2025-06-27 LAB — HOLD SPECIMEN: NORMAL

## 2025-06-28 ENCOUNTER — HOSPITAL ENCOUNTER (OUTPATIENT)
Facility: HOSPITAL | Age: 75
Discharge: HOME | End: 2025-06-29
Attending: EMERGENCY MEDICINE | Admitting: HOSPITALIST
Payer: COMMERCIAL

## 2025-06-28 DIAGNOSIS — I16.0 HYPERTENSIVE URGENCY: ICD-10-CM

## 2025-06-28 DIAGNOSIS — R51.9 FREQUENT HEADACHES: Primary | ICD-10-CM

## 2025-06-28 DIAGNOSIS — R10.10 UPPER ABDOMINAL PAIN: ICD-10-CM

## 2025-06-28 DIAGNOSIS — R29.818 ACUTE FOCAL NEUROLOGICAL DEFICIT: ICD-10-CM

## 2025-06-28 DIAGNOSIS — R07.9 CHEST PAIN: ICD-10-CM

## 2025-06-28 PROBLEM — R10.13 EPIGASTRIC PAIN: Status: ACTIVE | Noted: 2025-06-28

## 2025-06-28 PROBLEM — I10 PRIMARY HYPERTENSION: Status: ACTIVE | Noted: 2025-06-28

## 2025-06-28 PROBLEM — K21.9 GERD (GASTROESOPHAGEAL REFLUX DISEASE): Status: ACTIVE | Noted: 2025-06-28

## 2025-06-28 PROBLEM — E78.5 HYPERLIPIDEMIA: Status: ACTIVE | Noted: 2025-06-28

## 2025-06-28 PROBLEM — E83.39 HYPOPHOSPHATEMIA: Status: ACTIVE | Noted: 2025-06-28

## 2025-06-28 PROBLEM — F41.9 ANXIETY: Status: ACTIVE | Noted: 2025-06-28

## 2025-06-28 LAB
ABSOLUTE EOSINOPHIL (OHS): 0.07 K/UL
ABSOLUTE EOSINOPHIL (OHS): 0.37 K/UL
ABSOLUTE MONOCYTE (OHS): 0.34 K/UL (ref 0.3–1)
ABSOLUTE MONOCYTE (OHS): 1.1 K/UL (ref 0.3–1)
ABSOLUTE NEUTROPHIL COUNT (OHS): 3.63 K/UL (ref 1.8–7.7)
ABSOLUTE NEUTROPHIL COUNT (OHS): 6.77 K/UL (ref 1.8–7.7)
ALBUMIN SERPL BCP-MCNC: 3.8 G/DL (ref 3.5–5.2)
ALBUMIN SERPL BCP-MCNC: 4.1 G/DL (ref 3.5–5.2)
ALP SERPL-CCNC: 44 UNIT/L (ref 40–150)
ALP SERPL-CCNC: 53 UNIT/L (ref 40–150)
ALT SERPL W/O P-5'-P-CCNC: 16 UNIT/L (ref 10–44)
ALT SERPL W/O P-5'-P-CCNC: 16 UNIT/L (ref 10–44)
ANION GAP (OHS): 11 MMOL/L (ref 8–16)
ANION GAP (OHS): 12 MMOL/L (ref 8–16)
AST SERPL-CCNC: 25 UNIT/L (ref 11–45)
AST SERPL-CCNC: 26 UNIT/L (ref 11–45)
BASOPHILS # BLD AUTO: 0.04 K/UL
BASOPHILS # BLD AUTO: 0.06 K/UL
BASOPHILS NFR BLD AUTO: 0.4 %
BASOPHILS NFR BLD AUTO: 0.7 %
BILIRUB SERPL-MCNC: 0.3 MG/DL (ref 0.1–1)
BILIRUB SERPL-MCNC: 0.3 MG/DL (ref 0.1–1)
BILIRUB UR QL STRIP.AUTO: NEGATIVE
BNP SERPL-MCNC: 142 PG/ML (ref 0–99)
BUN SERPL-MCNC: 13 MG/DL (ref 8–23)
BUN SERPL-MCNC: 13 MG/DL (ref 8–23)
CALCIUM SERPL-MCNC: 8.9 MG/DL (ref 8.7–10.5)
CALCIUM SERPL-MCNC: 9.5 MG/DL (ref 8.7–10.5)
CHLORIDE SERPL-SCNC: 104 MMOL/L (ref 95–110)
CHLORIDE SERPL-SCNC: 104 MMOL/L (ref 95–110)
CLARITY UR: CLEAR
CO2 SERPL-SCNC: 18 MMOL/L (ref 23–29)
CO2 SERPL-SCNC: 19 MMOL/L (ref 23–29)
COLOR UR AUTO: COLORLESS
CREAT SERPL-MCNC: 1 MG/DL (ref 0.5–1.4)
CREAT SERPL-MCNC: 1 MG/DL (ref 0.5–1.4)
ERYTHROCYTE [DISTWIDTH] IN BLOOD BY AUTOMATED COUNT: 12 % (ref 11.5–14.5)
ERYTHROCYTE [DISTWIDTH] IN BLOOD BY AUTOMATED COUNT: 12.2 % (ref 11.5–14.5)
GFR SERPLBLD CREATININE-BSD FMLA CKD-EPI: 59 ML/MIN/1.73/M2
GFR SERPLBLD CREATININE-BSD FMLA CKD-EPI: 59 ML/MIN/1.73/M2
GLUCOSE SERPL-MCNC: 130 MG/DL (ref 70–110)
GLUCOSE SERPL-MCNC: 98 MG/DL (ref 70–110)
GLUCOSE UR QL STRIP: NEGATIVE
HCT VFR BLD AUTO: 33.7 % (ref 37–48.5)
HCT VFR BLD AUTO: 34.8 % (ref 37–48.5)
HGB BLD-MCNC: 11.5 GM/DL (ref 12–16)
HGB BLD-MCNC: 12.2 GM/DL (ref 12–16)
HGB UR QL STRIP: NEGATIVE
IMM GRANULOCYTES # BLD AUTO: 0.03 K/UL (ref 0–0.04)
IMM GRANULOCYTES # BLD AUTO: 0.05 K/UL (ref 0–0.04)
IMM GRANULOCYTES NFR BLD AUTO: 0.3 % (ref 0–0.5)
IMM GRANULOCYTES NFR BLD AUTO: 0.6 % (ref 0–0.5)
INR PPP: 0.9 (ref 0.8–1.2)
KETONES UR QL STRIP: NEGATIVE
LEUKOCYTE ESTERASE UR QL STRIP: NEGATIVE
LIPASE SERPL-CCNC: 58 U/L (ref 4–60)
LYMPHOCYTES # BLD AUTO: 1.15 K/UL (ref 1–4.8)
LYMPHOCYTES # BLD AUTO: 3.95 K/UL (ref 1–4.8)
MAGNESIUM SERPL-MCNC: 2.1 MG/DL (ref 1.6–2.6)
MAGNESIUM SERPL-MCNC: 2.2 MG/DL (ref 1.6–2.6)
MCH RBC QN AUTO: 30.9 PG (ref 27–31)
MCH RBC QN AUTO: 31 PG (ref 27–31)
MCHC RBC AUTO-ENTMCNC: 34.1 G/DL (ref 32–36)
MCHC RBC AUTO-ENTMCNC: 35.1 G/DL (ref 32–36)
MCV RBC AUTO: 88 FL (ref 82–98)
MCV RBC AUTO: 91 FL (ref 82–98)
NITRITE UR QL STRIP: NEGATIVE
NUCLEATED RBC (/100WBC) (OHS): 0 /100 WBC
NUCLEATED RBC (/100WBC) (OHS): 0 /100 WBC
PH UR STRIP: 8 [PH]
PHOSPHATE SERPL-MCNC: 1.8 MG/DL (ref 2.7–4.5)
PHOSPHATE SERPL-MCNC: 2 MG/DL (ref 2.7–4.5)
PLATELET # BLD AUTO: 268 K/UL (ref 150–450)
PLATELET # BLD AUTO: 301 K/UL (ref 150–450)
PMV BLD AUTO: 10 FL (ref 9.2–12.9)
PMV BLD AUTO: 9.5 FL (ref 9.2–12.9)
POTASSIUM SERPL-SCNC: 4.1 MMOL/L (ref 3.5–5.1)
POTASSIUM SERPL-SCNC: 4.4 MMOL/L (ref 3.5–5.1)
PROT SERPL-MCNC: 7.7 GM/DL (ref 6–8.4)
PROT SERPL-MCNC: 8.2 GM/DL (ref 6–8.4)
PROT UR QL STRIP: NEGATIVE
PROTHROMBIN TIME: 10.6 SECONDS (ref 9–12.5)
RBC # BLD AUTO: 3.72 M/UL (ref 4–5.4)
RBC # BLD AUTO: 3.94 M/UL (ref 4–5.4)
RELATIVE EOSINOPHIL (OHS): 0.8 %
RELATIVE EOSINOPHIL (OHS): 4.1 %
RELATIVE LYMPHOCYTE (OHS): 13.6 % (ref 18–48)
RELATIVE LYMPHOCYTE (OHS): 43.3 % (ref 18–48)
RELATIVE MONOCYTE (OHS): 12.1 % (ref 4–15)
RELATIVE MONOCYTE (OHS): 4 % (ref 4–15)
RELATIVE NEUTROPHIL (OHS): 39.8 % (ref 38–73)
RELATIVE NEUTROPHIL (OHS): 80.3 % (ref 38–73)
SODIUM SERPL-SCNC: 133 MMOL/L (ref 136–145)
SODIUM SERPL-SCNC: 135 MMOL/L (ref 136–145)
SP GR UR STRIP: 1.01
TROPONIN I SERPL DL<=0.01 NG/ML-MCNC: 0.01 NG/ML
TROPONIN I SERPL DL<=0.01 NG/ML-MCNC: 0.02 NG/ML
TROPONIN I SERPL DL<=0.01 NG/ML-MCNC: 0.02 NG/ML
TSH SERPL-ACNC: 2.1 UIU/ML (ref 0.4–4)
UROBILINOGEN UR STRIP-ACNC: NEGATIVE EU/DL
WBC # BLD AUTO: 8.44 K/UL (ref 3.9–12.7)
WBC # BLD AUTO: 9.12 K/UL (ref 3.9–12.7)

## 2025-06-28 PROCEDURE — G0378 HOSPITAL OBSERVATION PER HR: HCPCS

## 2025-06-28 PROCEDURE — 63600175 PHARM REV CODE 636 W HCPCS: Performed by: STUDENT IN AN ORGANIZED HEALTH CARE EDUCATION/TRAINING PROGRAM

## 2025-06-28 PROCEDURE — 84100 ASSAY OF PHOSPHORUS: CPT | Mod: 91 | Performed by: STUDENT IN AN ORGANIZED HEALTH CARE EDUCATION/TRAINING PROGRAM

## 2025-06-28 PROCEDURE — 84484 ASSAY OF TROPONIN QUANT: CPT | Performed by: EMERGENCY MEDICINE

## 2025-06-28 PROCEDURE — 96375 TX/PRO/DX INJ NEW DRUG ADDON: CPT

## 2025-06-28 PROCEDURE — 36415 COLL VENOUS BLD VENIPUNCTURE: CPT | Performed by: PHYSICIAN ASSISTANT

## 2025-06-28 PROCEDURE — 83880 ASSAY OF NATRIURETIC PEPTIDE: CPT | Performed by: EMERGENCY MEDICINE

## 2025-06-28 PROCEDURE — 36415 COLL VENOUS BLD VENIPUNCTURE: CPT | Performed by: STUDENT IN AN ORGANIZED HEALTH CARE EDUCATION/TRAINING PROGRAM

## 2025-06-28 PROCEDURE — 84443 ASSAY THYROID STIM HORMONE: CPT | Performed by: EMERGENCY MEDICINE

## 2025-06-28 PROCEDURE — 80053 COMPREHEN METABOLIC PANEL: CPT | Performed by: EMERGENCY MEDICINE

## 2025-06-28 PROCEDURE — 99204 OFFICE O/P NEW MOD 45 MIN: CPT | Mod: ,,, | Performed by: STUDENT IN AN ORGANIZED HEALTH CARE EDUCATION/TRAINING PROGRAM

## 2025-06-28 PROCEDURE — 25000003 PHARM REV CODE 250: Performed by: PHYSICIAN ASSISTANT

## 2025-06-28 PROCEDURE — 93010 ELECTROCARDIOGRAM REPORT: CPT | Mod: 76,,, | Performed by: INTERNAL MEDICINE

## 2025-06-28 PROCEDURE — 99285 EMERGENCY DEPT VISIT HI MDM: CPT | Mod: 25

## 2025-06-28 PROCEDURE — 96372 THER/PROPH/DIAG INJ SC/IM: CPT | Performed by: STUDENT IN AN ORGANIZED HEALTH CARE EDUCATION/TRAINING PROGRAM

## 2025-06-28 PROCEDURE — 84100 ASSAY OF PHOSPHORUS: CPT | Performed by: EMERGENCY MEDICINE

## 2025-06-28 PROCEDURE — 63600175 PHARM REV CODE 636 W HCPCS: Performed by: EMERGENCY MEDICINE

## 2025-06-28 PROCEDURE — 93010 ELECTROCARDIOGRAM REPORT: CPT | Mod: ,,, | Performed by: INTERNAL MEDICINE

## 2025-06-28 PROCEDURE — 25000003 PHARM REV CODE 250: Performed by: STUDENT IN AN ORGANIZED HEALTH CARE EDUCATION/TRAINING PROGRAM

## 2025-06-28 PROCEDURE — 96376 TX/PRO/DX INJ SAME DRUG ADON: CPT

## 2025-06-28 PROCEDURE — 84484 ASSAY OF TROPONIN QUANT: CPT | Mod: 91 | Performed by: PHYSICIAN ASSISTANT

## 2025-06-28 PROCEDURE — 96374 THER/PROPH/DIAG INJ IV PUSH: CPT

## 2025-06-28 PROCEDURE — 81003 URINALYSIS AUTO W/O SCOPE: CPT | Performed by: EMERGENCY MEDICINE

## 2025-06-28 PROCEDURE — 93005 ELECTROCARDIOGRAM TRACING: CPT

## 2025-06-28 PROCEDURE — 83735 ASSAY OF MAGNESIUM: CPT | Performed by: EMERGENCY MEDICINE

## 2025-06-28 PROCEDURE — 85610 PROTHROMBIN TIME: CPT | Performed by: EMERGENCY MEDICINE

## 2025-06-28 PROCEDURE — 85025 COMPLETE CBC W/AUTO DIFF WBC: CPT | Performed by: EMERGENCY MEDICINE

## 2025-06-28 PROCEDURE — 83690 ASSAY OF LIPASE: CPT | Performed by: EMERGENCY MEDICINE

## 2025-06-28 PROCEDURE — 82247 BILIRUBIN TOTAL: CPT | Performed by: STUDENT IN AN ORGANIZED HEALTH CARE EDUCATION/TRAINING PROGRAM

## 2025-06-28 PROCEDURE — 85025 COMPLETE CBC W/AUTO DIFF WBC: CPT | Mod: 91 | Performed by: STUDENT IN AN ORGANIZED HEALTH CARE EDUCATION/TRAINING PROGRAM

## 2025-06-28 PROCEDURE — 83735 ASSAY OF MAGNESIUM: CPT | Mod: 91 | Performed by: STUDENT IN AN ORGANIZED HEALTH CARE EDUCATION/TRAINING PROGRAM

## 2025-06-28 RX ORDER — ACETAMINOPHEN 325 MG/1
650 TABLET ORAL EVERY 8 HOURS PRN
Status: DISCONTINUED | OUTPATIENT
Start: 2025-06-28 | End: 2025-06-29 | Stop reason: HOSPADM

## 2025-06-28 RX ORDER — SODIUM CHLORIDE 0.9 % (FLUSH) 0.9 %
10 SYRINGE (ML) INJECTION EVERY 12 HOURS PRN
Status: DISCONTINUED | OUTPATIENT
Start: 2025-06-28 | End: 2025-06-29 | Stop reason: HOSPADM

## 2025-06-28 RX ORDER — HYDRALAZINE HYDROCHLORIDE 20 MG/ML
5 INJECTION INTRAMUSCULAR; INTRAVENOUS EVERY 6 HOURS PRN
Status: DISCONTINUED | OUTPATIENT
Start: 2025-06-28 | End: 2025-06-29 | Stop reason: HOSPADM

## 2025-06-28 RX ORDER — SIMETHICONE 80 MG
1 TABLET,CHEWABLE ORAL 4 TIMES DAILY PRN
Status: DISCONTINUED | OUTPATIENT
Start: 2025-06-28 | End: 2025-06-29 | Stop reason: HOSPADM

## 2025-06-28 RX ORDER — SIMETHICONE 80 MG
1 TABLET,CHEWABLE ORAL ONCE
Status: COMPLETED | OUTPATIENT
Start: 2025-06-28 | End: 2025-06-28

## 2025-06-28 RX ORDER — ALENDRONATE SODIUM 70 MG/1
70 TABLET ORAL
Status: ON HOLD | COMMUNITY
Start: 2025-06-09

## 2025-06-28 RX ORDER — IBUPROFEN 200 MG
16 TABLET ORAL
Status: DISCONTINUED | OUTPATIENT
Start: 2025-06-28 | End: 2025-06-29 | Stop reason: HOSPADM

## 2025-06-28 RX ORDER — NALOXONE HCL 0.4 MG/ML
0.02 VIAL (ML) INJECTION
Status: DISCONTINUED | OUTPATIENT
Start: 2025-06-28 | End: 2025-06-29 | Stop reason: HOSPADM

## 2025-06-28 RX ORDER — SODIUM,POTASSIUM PHOSPHATES 280-250MG
1 POWDER IN PACKET (EA) ORAL ONCE
Status: DISCONTINUED | OUTPATIENT
Start: 2025-06-28 | End: 2025-06-28

## 2025-06-28 RX ORDER — PANTOPRAZOLE SODIUM 40 MG/10ML
40 INJECTION, POWDER, LYOPHILIZED, FOR SOLUTION INTRAVENOUS 2 TIMES DAILY
Status: DISCONTINUED | OUTPATIENT
Start: 2025-06-28 | End: 2025-06-29 | Stop reason: HOSPADM

## 2025-06-28 RX ORDER — BUTALBITAL, ACETAMINOPHEN AND CAFFEINE 50; 325; 40 MG/1; MG/1; MG/1
1 TABLET ORAL ONCE
Status: COMPLETED | OUTPATIENT
Start: 2025-06-28 | End: 2025-06-28

## 2025-06-28 RX ORDER — KETOROLAC TROMETHAMINE 30 MG/ML
15 INJECTION, SOLUTION INTRAMUSCULAR; INTRAVENOUS
Status: COMPLETED | OUTPATIENT
Start: 2025-06-28 | End: 2025-06-28

## 2025-06-28 RX ORDER — LANOLIN ALCOHOL/MO/W.PET/CERES
800 CREAM (GRAM) TOPICAL
Status: DISCONTINUED | OUTPATIENT
Start: 2025-06-28 | End: 2025-06-29 | Stop reason: HOSPADM

## 2025-06-28 RX ORDER — AMOXICILLIN 250 MG
1 CAPSULE ORAL DAILY PRN
Status: DISCONTINUED | OUTPATIENT
Start: 2025-06-28 | End: 2025-06-29 | Stop reason: HOSPADM

## 2025-06-28 RX ORDER — LABETALOL HYDROCHLORIDE 5 MG/ML
10 INJECTION, SOLUTION INTRAVENOUS EVERY 6 HOURS PRN
Status: DISCONTINUED | OUTPATIENT
Start: 2025-06-28 | End: 2025-06-29 | Stop reason: HOSPADM

## 2025-06-28 RX ORDER — HYDROXYZINE HYDROCHLORIDE 25 MG/1
25 TABLET, FILM COATED ORAL 3 TIMES DAILY PRN
Status: DISCONTINUED | OUTPATIENT
Start: 2025-06-28 | End: 2025-06-29 | Stop reason: HOSPADM

## 2025-06-28 RX ORDER — SODIUM,POTASSIUM PHOSPHATES 280-250MG
2 POWDER IN PACKET (EA) ORAL
Status: DISCONTINUED | OUTPATIENT
Start: 2025-06-28 | End: 2025-06-29 | Stop reason: HOSPADM

## 2025-06-28 RX ORDER — GLUCAGON 1 MG
1 KIT INJECTION
Status: DISCONTINUED | OUTPATIENT
Start: 2025-06-28 | End: 2025-06-29 | Stop reason: HOSPADM

## 2025-06-28 RX ORDER — ACETAMINOPHEN 325 MG/1
650 TABLET ORAL EVERY 4 HOURS PRN
Status: DISCONTINUED | OUTPATIENT
Start: 2025-06-28 | End: 2025-06-29 | Stop reason: HOSPADM

## 2025-06-28 RX ORDER — TALC
6 POWDER (GRAM) TOPICAL NIGHTLY PRN
Status: DISCONTINUED | OUTPATIENT
Start: 2025-06-28 | End: 2025-06-29 | Stop reason: HOSPADM

## 2025-06-28 RX ORDER — LABETALOL HYDROCHLORIDE 5 MG/ML
10 INJECTION, SOLUTION INTRAVENOUS
Status: COMPLETED | OUTPATIENT
Start: 2025-06-28 | End: 2025-06-28

## 2025-06-28 RX ORDER — ENOXAPARIN SODIUM 100 MG/ML
40 INJECTION SUBCUTANEOUS EVERY 24 HOURS
Status: DISCONTINUED | OUTPATIENT
Start: 2025-06-28 | End: 2025-06-29 | Stop reason: HOSPADM

## 2025-06-28 RX ORDER — ONDANSETRON HYDROCHLORIDE 2 MG/ML
4 INJECTION, SOLUTION INTRAVENOUS EVERY 8 HOURS PRN
Status: DISCONTINUED | OUTPATIENT
Start: 2025-06-28 | End: 2025-06-29 | Stop reason: HOSPADM

## 2025-06-28 RX ORDER — VALSARTAN 80 MG/1
320 TABLET ORAL DAILY
Status: DISCONTINUED | OUTPATIENT
Start: 2025-06-28 | End: 2025-06-29 | Stop reason: HOSPADM

## 2025-06-28 RX ORDER — IBUPROFEN 200 MG
24 TABLET ORAL
Status: DISCONTINUED | OUTPATIENT
Start: 2025-06-28 | End: 2025-06-29 | Stop reason: HOSPADM

## 2025-06-28 RX ORDER — ONDANSETRON HYDROCHLORIDE 2 MG/ML
4 INJECTION, SOLUTION INTRAVENOUS
Status: COMPLETED | OUTPATIENT
Start: 2025-06-28 | End: 2025-06-28

## 2025-06-28 RX ORDER — BISACODYL 10 MG/1
10 SUPPOSITORY RECTAL DAILY PRN
Status: DISCONTINUED | OUTPATIENT
Start: 2025-06-28 | End: 2025-06-29 | Stop reason: HOSPADM

## 2025-06-28 RX ORDER — HYDRALAZINE HYDROCHLORIDE 25 MG/1
25 TABLET, FILM COATED ORAL EVERY 12 HOURS
Status: DISCONTINUED | OUTPATIENT
Start: 2025-06-28 | End: 2025-06-28

## 2025-06-28 RX ORDER — ALUMINUM HYDROXIDE, MAGNESIUM HYDROXIDE, AND SIMETHICONE 1200; 120; 1200 MG/30ML; MG/30ML; MG/30ML
30 SUSPENSION ORAL 4 TIMES DAILY PRN
Status: DISCONTINUED | OUTPATIENT
Start: 2025-06-28 | End: 2025-06-29 | Stop reason: HOSPADM

## 2025-06-28 RX ORDER — DEXAMETHASONE SODIUM PHOSPHATE 4 MG/ML
4 INJECTION, SOLUTION INTRA-ARTICULAR; INTRALESIONAL; INTRAMUSCULAR; INTRAVENOUS; SOFT TISSUE
Status: COMPLETED | OUTPATIENT
Start: 2025-06-28 | End: 2025-06-28

## 2025-06-28 RX ORDER — BUTALBITAL, ACETAMINOPHEN AND CAFFEINE 50; 325; 40 MG/1; MG/1; MG/1
1 TABLET ORAL EVERY 4 HOURS PRN
Status: DISCONTINUED | OUTPATIENT
Start: 2025-06-28 | End: 2025-06-29 | Stop reason: HOSPADM

## 2025-06-28 RX ADMIN — HYDRALAZINE HYDROCHLORIDE 5 MG: 20 INJECTION INTRAMUSCULAR; INTRAVENOUS at 05:06

## 2025-06-28 RX ADMIN — PANTOPRAZOLE SODIUM 40 MG: 40 INJECTION, POWDER, FOR SOLUTION INTRAVENOUS at 08:06

## 2025-06-28 RX ADMIN — LABETALOL HYDROCHLORIDE 10 MG: 5 INJECTION INTRAVENOUS at 04:06

## 2025-06-28 RX ADMIN — BUTALBITAL, ACETAMINOPHEN, AND CAFFEINE 1 TABLET: 325; 50; 40 TABLET ORAL at 05:06

## 2025-06-28 RX ADMIN — PANTOPRAZOLE SODIUM 40 MG: 40 INJECTION, POWDER, FOR SOLUTION INTRAVENOUS at 04:06

## 2025-06-28 RX ADMIN — ENOXAPARIN SODIUM 40 MG: 40 INJECTION SUBCUTANEOUS at 04:06

## 2025-06-28 RX ADMIN — ALUMINUM HYDROXIDE, MAGNESIUM HYDROXIDE, AND SIMETHICONE 30 ML: 200; 200; 20 SUSPENSION ORAL at 05:06

## 2025-06-28 RX ADMIN — BUTALBITAL, ACETAMINOPHEN, AND CAFFEINE 1 TABLET: 325; 50; 40 TABLET ORAL at 10:06

## 2025-06-28 RX ADMIN — ONDANSETRON 4 MG: 2 INJECTION INTRAMUSCULAR; INTRAVENOUS at 04:06

## 2025-06-28 RX ADMIN — KETOROLAC TROMETHAMINE 15 MG: 30 INJECTION, SOLUTION INTRAMUSCULAR; INTRAVENOUS at 02:06

## 2025-06-28 RX ADMIN — LABETALOL HYDROCHLORIDE 10 MG: 5 INJECTION INTRAVENOUS at 12:06

## 2025-06-28 RX ADMIN — DEXAMETHASONE SODIUM PHOSPHATE 4 MG: 4 INJECTION INTRA-ARTICULAR; INTRALESIONAL; INTRAMUSCULAR; INTRAVENOUS; SOFT TISSUE at 02:06

## 2025-06-28 RX ADMIN — VALSARTAN 320 MG: 80 TABLET, FILM COATED ORAL at 04:06

## 2025-06-28 RX ADMIN — HYDRALAZINE HYDROCHLORIDE 5 MG: 20 INJECTION INTRAMUSCULAR; INTRAVENOUS at 01:06

## 2025-06-28 RX ADMIN — ONDANSETRON 4 MG: 2 INJECTION INTRAMUSCULAR; INTRAVENOUS at 02:06

## 2025-06-28 RX ADMIN — ACETAMINOPHEN 650 MG: 325 TABLET ORAL at 04:06

## 2025-06-28 RX ADMIN — SIMETHICONE 80 MG: 80 TABLET, CHEWABLE ORAL at 10:06

## 2025-06-28 RX ADMIN — HYDROXYZINE HYDROCHLORIDE 25 MG: 25 TABLET, FILM COATED ORAL at 04:06

## 2025-06-28 NOTE — ASSESSMENT & PLAN NOTE
Patient's most recent phosphorus results are listed below.   Recent Labs     06/26/25  0618 06/28/25  0047 06/28/25  0511   PHOS 2.1* 1.8* 2.0*     Plan  - Replace PRN

## 2025-06-28 NOTE — ASSESSMENT & PLAN NOTE
Likely tension headaches vs migraine. Reports has had episodic headaches for many years  CT head/MRI brain without acute process  Symptomatic control   Trial of fioricet for symptoms  Outpatient neurology referral for migraine management

## 2025-06-28 NOTE — ASSESSMENT & PLAN NOTE
Patient's most recent phosphorus results are listed below.   Recent Labs     06/26/25  0618 06/28/25  0047   PHOS 2.1* 1.8*     Plan  - Replace PRN

## 2025-06-28 NOTE — ED TRIAGE NOTES
Pt arrived reporting dizziness, 10/10 frontal and occipital HA, and nausea. Recently seen for similar symptoms. Denies CP, SOB, numbness/tingling, vomiting. HOB elevated, SR up x 2, call light within reach. Daughter at bedside

## 2025-06-28 NOTE — H&P
[facility] Medicine  History & Physical    Patient Name: Monique Merchant  MRN: [id number]  Patient Class: OP- Observation  Admission Date: 6/28/2025  Attending Physician: Miguelito Vaughn MD   Primary Care Provider: Unable, To Obtain         Patient information was obtained from patient, relative(s), past medical records, and ER records.     Subjective:     Principal Problem:Epigastric pain    Chief Complaint:   Chief Complaint   Patient presents with    Abdominal Pain     Pt c/o 10/10 upper abd pain, N/V x1 week accompanied with a HA starting tonight. Pt hypertensive in triage, reports hx of HTN and is compliant with BP meds. Pt denies SOB, CP.         HPI: This is a 75-year-old female with a past medical history of hyperlipidemia, GERD, anxiety, who presents with headache.     Patient presents for evaluation of headaches, abdominal pain and dizziness that started about 3 days prior to presentation.  She reports that she has these symptoms chronically, but recently they became worse.  Patient reports epigastric abdominal pain without radiation, associated with nausea. She believes this is her gastritis pain.  She also reports headaches with subjective photophobia, and dizziness.  She believes that her symptoms worsened after eating Tamales last week.  Patient initially presented to the ED on 06/25 with these symptoms.  CT head and CT abdomen/pelvis showed no acute process.  She was treated symptomatically and was discharged to follow-up with gastroenterology.  She returned to the ED on 06/26 for evaluation of melena, headaches, dizziness and generalized weakness.  Her workup was unremarkable, and she was again treated symptomatically.    In the ED, the patient was hypertensive (up to 225/110).  Labs were remarkable for hypophosphatemia (1.8), low CO2 (19), elevated BNP (142).  CT head showed no acute process.  MRI brain showed no acute process.  Patient was given  potassium, sodium phosphate, Zofran 4 mg IV, labetalol 10 mg IV, Toradol 15 mg IV, Decadron 4 mg IV.  She was admitted for further management.     ID#     Past Medical History:   Diagnosis Date    Arthritis     Hypertension        No past surgical history on file.    Review of patient's allergies indicates:  No Known Allergies    No current facility-administered medications on file prior to encounter.     Current Outpatient Medications on File Prior to Encounter   Medication Sig    acetaminophen (TYLENOL) 500 MG tablet Take 1 tablet (500 mg total) by mouth every 4 (four) hours as needed.    albuterol (PROVENTIL/VENTOLIN HFA) 90 mcg/actuation inhaler Inhale 1-2 puffs into the lungs every 6 (six) hours as needed for Wheezing. Rescue    aluminum & magnesium hydroxide-simethicone (MAALOX MAXIMUM STRENGTH) 400-400-40 mg/5 mL suspension Take 15 mLs by mouth every 6 (six) hours as needed for Indigestion.    azithromycin (Z-PINKY) 250 MG tablet Take 1 tablet (250 mg total) by mouth once daily. Take first 2 tablets together, then 1 every day until finished.    cetirizine (ZYRTEC) 10 MG tablet Take 1 tablet (10 mg total) by mouth once daily.    famotidine (PEPCID) 20 MG tablet Take 1 tablet (20 mg total) by mouth 2 (two) times daily.    fluticasone propionate (FLONASE) 50 mcg/actuation nasal spray 1 spray (50 mcg total) by Each Nostril route 2 (two) times daily as needed for Rhinitis.    ibuprofen (ADVIL,MOTRIN) 600 MG tablet Take 1 tablet (600 mg total) by mouth every 6 (six) hours as needed for Pain or Temperature greater than (100.5 or greater).    LIDOcaine (LIDODERM) 5 % Place 1 patch onto the skin once daily. Remove & Discard patch within 12 hours or as directed by MD. May use 4% over the counter if not covered by insurance for 15 days    olmesartan (BENICAR) 40 MG tablet Take 1 tablet (40 mg total) by mouth once daily.    omeprazole (PRILOSEC) 20 MG capsule Take 1 capsule (20 mg total) by mouth once  daily.    ondansetron (ZOFRAN) 4 MG tablet Take 1 tablet (4 mg total) by mouth every 8 (eight) hours as needed for Nausea.    oxyCODONE (ROXICODONE) 5 MG immediate release tablet Take 1 tablet (5 mg total) by mouth every 8 (eight) hours as needed for Pain.     Family History    None       Tobacco Use    Smoking status: Never    Smokeless tobacco: Never   Substance and Sexual Activity    Alcohol use: Never    Drug use: Never    Sexual activity: Not on file     Review of Systems   Constitutional:  Positive for chills.   Respiratory: Negative.     Cardiovascular: Negative.    Gastrointestinal:  Positive for abdominal pain and nausea.   Genitourinary: Negative.    Musculoskeletal: Negative.    Neurological:  Positive for dizziness and headaches.     Objective:     Vital Signs (Most Recent):  Temp: 97.9 °F (36.6 °C) (06/28/25 0027)  Pulse: 86 (06/28/25 0400)  Resp: 10 (06/28/25 0300)  BP: (!) 151/70 (06/28/25 0401)  SpO2: 97 % (06/28/25 0400) Vital Signs (24h Range):  Temp:  [97.9 °F (36.6 °C)] 97.9 °F (36.6 °C)  Pulse:  [] 86  Resp:  [10-22] 10  SpO2:  [97 %-100 %] 97 %  BP: (137-225)/() 151/70     Weight: 68 kg (150 lb)  Body mass index is 28.34 kg/m².     Physical Exam  Vitals and nursing note reviewed.   Constitutional:       General: She is not in acute distress.     Appearance: She is not ill-appearing.   HENT:      Mouth/Throat:      Mouth: Mucous membranes are moist.   Cardiovascular:      Rate and Rhythm: Normal rate.   Pulmonary:      Effort: Pulmonary effort is normal.   Abdominal:      General: Abdomen is flat.   Skin:     General: Skin is warm.   Neurological:      General: No focal deficit present.      Mental Status: She is alert.                Significant Labs: All pertinent labs within the past 24 hours have been reviewed.    Significant Imaging: I have reviewed all pertinent imaging results/findings within the past 24 hours.  Assessment/Plan:     Assessment & Plan  Epigastric pain  Likely  related to gastritis/PUD  CT abdomen and pelvis (6/25):  No acute abnormality. Mild hepatomegaly. Diverticulosis of the colon.  Symptomatic control   Consult GI  Primary hypertension  Patient's blood pressure range in the last 24 hours was: BP  Min: 137/63  Max: 225/110.The patient's inpatient anti-hypertensive regimen is listed below:  Current Antihypertensives  valsartan tablet 320 mg, Daily, Oral  labetaloL injection 10 mg, Every 6 hours PRN, Intravenous  hydrALAZINE injection 5 mg, Every 6 hours PRN, Intravenous    Plan  - BP is controlled, no changes needed to their regimen    Hypophosphatemia  Patient's most recent phosphorus results are listed below.   Recent Labs     06/26/25  0618 06/28/25  0047   PHOS 2.1* 1.8*     Plan  - Replace PRN  Frequent headaches  Likely tension headaches  CT head/MRI brain without acute process  Symptomatic control     VTE Risk Mitigation (From admission, onward)           Ordered     enoxaparin injection 40 mg  Daily         06/28/25 0344     IP VTE HIGH RISK PATIENT  Once         06/28/25 0344     Place sequential compression device  Until discontinued         06/28/25 0344                      On 06/28/2025, patient should be placed in hospital observation services under my care.             Alok Horne MD  Department of Hospital Medicine  [facility] Surg

## 2025-06-28 NOTE — ASSESSMENT & PLAN NOTE
Likely related to gastritis/PUD, or a symptom of migraine as she reports headache began first. Denies NSAID use for headache.  CT abdomen and pelvis (6/25):  No acute abnormality. Mild hepatomegaly. Diverticulosis of the colon.  Symptomatic control   Consult GI

## 2025-06-28 NOTE — SUBJECTIVE & OBJECTIVE
Past Medical History:   Diagnosis Date    Arthritis     Hypertension        No past surgical history on file.    Review of patient's allergies indicates:  No Known Allergies    No current facility-administered medications on file prior to encounter.     Current Outpatient Medications on File Prior to Encounter   Medication Sig    acetaminophen (TYLENOL) 500 MG tablet Take 1 tablet (500 mg total) by mouth every 4 (four) hours as needed.    albuterol (PROVENTIL/VENTOLIN HFA) 90 mcg/actuation inhaler Inhale 1-2 puffs into the lungs every 6 (six) hours as needed for Wheezing. Rescue    aluminum & magnesium hydroxide-simethicone (MAALOX MAXIMUM STRENGTH) 400-400-40 mg/5 mL suspension Take 15 mLs by mouth every 6 (six) hours as needed for Indigestion.    azithromycin (Z-PINKY) 250 MG tablet Take 1 tablet (250 mg total) by mouth once daily. Take first 2 tablets together, then 1 every day until finished.    cetirizine (ZYRTEC) 10 MG tablet Take 1 tablet (10 mg total) by mouth once daily.    famotidine (PEPCID) 20 MG tablet Take 1 tablet (20 mg total) by mouth 2 (two) times daily.    fluticasone propionate (FLONASE) 50 mcg/actuation nasal spray 1 spray (50 mcg total) by Each Nostril route 2 (two) times daily as needed for Rhinitis.    ibuprofen (ADVIL,MOTRIN) 600 MG tablet Take 1 tablet (600 mg total) by mouth every 6 (six) hours as needed for Pain or Temperature greater than (100.5 or greater).    LIDOcaine (LIDODERM) 5 % Place 1 patch onto the skin once daily. Remove & Discard patch within 12 hours or as directed by MD. May use 4% over the counter if not covered by insurance for 15 days    olmesartan (BENICAR) 40 MG tablet Take 1 tablet (40 mg total) by mouth once daily.    omeprazole (PRILOSEC) 20 MG capsule Take 1 capsule (20 mg total) by mouth once daily.    ondansetron (ZOFRAN) 4 MG tablet Take 1 tablet (4 mg total) by mouth every 8 (eight) hours as needed for Nausea.    oxyCODONE (ROXICODONE) 5 MG immediate release  tablet Take 1 tablet (5 mg total) by mouth every 8 (eight) hours as needed for Pain.     Family History    None       Tobacco Use    Smoking status: Never    Smokeless tobacco: Never   Substance and Sexual Activity    Alcohol use: Never    Drug use: Never    Sexual activity: Not on file     Review of Systems   Constitutional:  Positive for chills.   Respiratory: Negative.     Cardiovascular: Negative.    Gastrointestinal:  Positive for abdominal pain and nausea.   Genitourinary: Negative.    Musculoskeletal: Negative.    Neurological:  Positive for dizziness and headaches.     Objective:     Vital Signs (Most Recent):  Temp: 97.9 °F (36.6 °C) (06/28/25 0027)  Pulse: 86 (06/28/25 0400)  Resp: 10 (06/28/25 0300)  BP: (!) 151/70 (06/28/25 0401)  SpO2: 97 % (06/28/25 0400) Vital Signs (24h Range):  Temp:  [97.9 °F (36.6 °C)] 97.9 °F (36.6 °C)  Pulse:  [] 86  Resp:  [10-22] 10  SpO2:  [97 %-100 %] 97 %  BP: (137-225)/() 151/70     Weight: 68 kg (150 lb)  Body mass index is 28.34 kg/m².     Physical Exam  Vitals and nursing note reviewed.   Constitutional:       General: She is not in acute distress.     Appearance: She is not ill-appearing.   HENT:      Mouth/Throat:      Mouth: Mucous membranes are moist.   Cardiovascular:      Rate and Rhythm: Normal rate.   Pulmonary:      Effort: Pulmonary effort is normal.   Abdominal:      General: Abdomen is flat.   Skin:     General: Skin is warm.   Neurological:      General: No focal deficit present.      Mental Status: She is alert.                Significant Labs: All pertinent labs within the past 24 hours have been reviewed.    Significant Imaging: I have reviewed all pertinent imaging results/findings within the past 24 hours.

## 2025-06-28 NOTE — PLAN OF CARE
Problem: Adult Inpatient Plan of Care  Goal: Plan of Care Review  Outcome: Progressing     Problem: Pain Acute  Goal: Optimal Pain Control and Function  Outcome: Progressing     Problem: Fall Injury Risk  Goal: Absence of Fall and Fall-Related Injury  Outcome: Progressing     Problem: Nausea and Vomiting  Goal: Nausea and Vomiting Relief  Outcome: Progressing     Problem: Comorbidity Management  Goal: Blood Pressure in Desired Range  Outcome: Progressing

## 2025-06-28 NOTE — ED PROVIDER NOTES
Encounter Date: 6/28/2025    ------------------------------------------------------------------------------------------------------------------  IPrincess, have reviewed the SORT/triage note.    History obtained from pt and daughter- who are a reliable and independent historian     History       Chief Complaint   Patient presents with    Abdominal Pain     Pt c/o 10/10 upper abd pain, N/V x1 week accompanied with a HA starting tonight. Pt hypertensive in triage, reports hx of HTN and is compliant with BP meds. Pt denies SOB, CP.        Nursing note reviewed and verified by me.  Details elaborated and clarified below.    HPI:   75 y.o. female PMHx HTN, Anxiety, GERD presents with moderate to severe, occipital and frontal  headache, nausea and abdominal pain that worsened in the last 24 hours. Symptoms associated Worsening headache and dizziness, and a one-week history of nausea and midepigastric pain. Her chronic conditions impacting care include hypertension, gastritis, and a possible history of benign paroxysmal positional vertigo (BPPV), though no formal diagnosis is documented in the medical record. The patient has a known history of gastroesophageal reflux and gastritis, previously treated with omeprazole, which she is not currently taking. She denies vomiting, hematemesis, melena, hematochezia, constipation, or diarrhea. No chest pain is reported, but she does note fatigue over the past 48 hours, which prompted her daughter to bring her to the ED two days ago. She was evaluated here three days ago for similar symptoms (epigastric pain and nausea), and reports decreased oral intake since that time. She also reports left lumbar back pain, which has been noted in prior evaluations. She denies recent falls, trauma, weakness, paresthesias, or bowel/bladder incontinence.    Past Medical History:   Diagnosis Date    Arthritis     Hypertension      Diffuse arthralgia 03/10/2025   Rotator cuff syndrome of  "right shoulder 03/10/2025   BRENDA (acute kidney injury) (CMS/HCC) 01/16/2025   Anxiety 08/28/2024   Mixed hyperlipidemia 08/02/2024   Screening for ischemic heart disease 05/29/2024   Chronic midline low back pain without sciatica 05/29/2024   Labile blood pressure 11/01/2023   GERD without esophagitis 11/01/2023   Age-related osteoporosis without current pathological fracture 11/01/2023   Generalized arthritis 11/01/2023     Surgical History:  Past Surgical History:   Procedure Laterality Date   CHOLECYSTECTOMY   COLONOSCOPY N/A 11/14/2024   Procedure: COLONOSCOPY; Surgeon: Stanley Orozco Jr., MD; Location: [facility]; Service: Gastroenterology; Laterality: N/A;     Family History:  Family History   Problem Relation Age of Onset   No Known Problems Mother   No Known Problems Father       Social History[1]    Past Medical, Surgical and Social history reviewed and verified by me.    Review of patient's allergies indicates:  No Known Allergies    Medications Ordered Prior to Encounter[2]    Review of Systems as per HPI  Review of Systems   Unable to perform ROS: Acuity of condition   Neurological:  Positive for dizziness and headaches.         PHYSICAL EXAMINATION: evaluated in ED room 09main/09main      ED Triage Vitals [06/28/25 0027]   Encounter Vitals Group      BP (!) 225/110      Girls Systolic BP Percentile       Girls Diastolic BP Percentile       Boys Systolic BP Percentile       Boys Diastolic BP Percentile       Pulse 109      Resp 20      Temp 97.9 °F (36.6 °C)      Temp Source Oral      SpO2 100 %      Weight 150 lb      Height 5' 1"      Head Circumference       Peak Flow       Pain Score       Pain Loc       Pain Education       Exclude from Growth Chart          Vital signs and nursing assessment noted:  Elevated blood pressure, tachycardia    GEN:   NAD, A & Ox3, atraumatic, ill-appearing, nontoxic appearing  HEENT:  PERRLA, EOMI, moist membranes, nl conjunctiva, no scleral icterus, no " nystagmus, no nodes/nodules, soft, supple, FROM, no trachial deviation.  CV:   Tachycardia, regular rhythm, no m/r/g, 2+ radial pulses, <2sec cap refill, no obvious JVD  RESP:  CTA B, no w/r/r, equal and bilateral chest rise, no respiratory distress  ABD:   soft, Nontender, Nondistended, +BS, no guarding/rebound  :   Deferred  BACK:  FROM, no midline tenderness, no paraspinal tenderness  EXT:   FROM, BUCK x 4, no swelling, no edema, no calf tenderness, no bony tenderness, no warmth or redness, no crepitus, no obvious deformity  LYMPH:  no gross adenopathy  NEURO:  GCS 15, CN II-XII grossly intact, no obvious motor/sensory deficit, no tremor,   PSYCH:   Cooperative, no SI/HI, no anxiety, nl mood/affect, nl judgement/thought process  SKIN:  no rashes/lesions or masses, nl color, no pallor, warm, dry, intact      TESTS Ordered     Labs Reviewed   COMPREHENSIVE METABOLIC PANEL - Abnormal       Result Value    Sodium 135 (*)     Potassium 4.1      Chloride 104      CO2 19 (*)     Glucose 98      BUN 13      Creatinine 1.0      Calcium 9.5      Protein Total 8.2      Albumin 4.1      Bilirubin Total 0.3      ALP 53      AST 26      ALT 16      Anion Gap 12      eGFR 59 (*)    CBC WITH DIFFERENTIAL - Abnormal    WBC 9.12      RBC 3.94 (*)     HGB 12.2      HCT 34.8 (*)     MCV 88      MCH 31.0      MCHC 35.1      RDW 12.0      Platelet Count 301      MPV 9.5      Nucleated RBC 0      Neut % 39.8      Lymph % 43.3      Mono % 12.1      Eos % 4.1      Basophil % 0.4      Imm Grans % 0.3      Neut # 3.63      Lymph # 3.95      Mono # 1.10 (*)     Eos # 0.37      Baso # 0.04      Imm Grans # 0.03     B-TYPE NATRIURETIC PEPTIDE - Abnormal     (*)    PHOSPHORUS - Abnormal    Phosphorus Level 1.8 (*)    MAGNESIUM - Normal    Magnesium  2.2     LIPASE - Normal    Lipase Level 58     TROPONIN I - Normal    Troponin-I 0.006     PROTIME-INR - Normal    PT 10.6      INR 0.9     TSH - Normal    TSH 2.096     CBC W/ AUTO  DIFFERENTIAL    Narrative:     The following orders were created for panel order CBC auto differential.                  Procedure                               Abnormality         Status                                     ---------                               -----------         ------                                     CBC with Differential[[id number]]       Abnormal            Final result                                                 Please view results for these tests on the individual orders.   URINALYSIS, REFLEX TO URINE CULTURE     CT Head Without Contrast   Final Result      No acute intracranial abnormalities identified.         Electronically signed by: Gayatri Mclaughlin MD   Date:    06/28/2025   Time:    01:21      MRI Brain Without Contrast    (Results Pending)       EKG reading noted in ED course.     Results for orders placed or performed during the hospital encounter of 06/26/25   EKG 12-lead    Collection Time: 06/25/25  1:42 PM   Result Value Ref Range    QRS Duration 80 ms    [facility] QTC Calculation 408 ms    Narrative    Test Reason : R42,    Vent. Rate :  68 BPM     Atrial Rate :  68 BPM     P-R Int : 176 ms          QRS Dur :  80 ms      QT Int : 384 ms       P-R-T Axes :  57  35  40 degrees    QTcB Int : 408 ms    Normal sinus rhythm  Normal ECG  When compared with ECG of 17-Jan-2024 21:55,  No significant change was found  Confirmed by Bernie Bañuelos ([id number]) on 6/26/2025 4:12:53 PM    Referred By: AAAREFERRAL SELF           Confirmed By: Bernie Bañuelos       Procedures   Critical care was necessary to treat or prevent imminent or life-threatening deterioration.   Critical care time: 31 min  Time spent at the bedside, reviewing test results, discussing the case with staff and/or consult(s), documenting the medical record and time spent with family members discussing treatment and management decisions.    The time involved in the performance of separately reportable procedures was not counted toward  critical care time    MEDICAL DECISION MAKING   Review of records from external source indicates that Patient's PCP is Otis Justin MD .    History by independent historian and supplemented by Review of records from external source which were checked/reviewed in EMR:   CT head (June 26, 2025): No acute intracranial process  CT abdomen/pelvis: Chronic lumbar spine compression fracture and degenerative/arthritic changes; no evidence of bowel obstruction or acute intra-abdominal pathology  Colorectal history: Positive FIT test in 2024 led to colonoscopy in November 2024, which revealed a 1 mm cecal hyperplastic polyp (HPP). Follow-up interval is 10 years, making that her final screening colonoscopy at age 84  History obtained from source other than patient (family) patient does not appear to be herself and there is concerned that family has a patient will have a heart attack because of her elevated blood pressure.    This is an emergent evaluation of a acute, new and undiagnosed problem for a 75 y.o. female with the following chronic conditions affecting care hypertension, gastritis, ?BBPV presenting with one day of worsening headache and dizziness, along with a one-week history of nausea and midepigastric pain. She reports a history of reflux and gastritis, previously treated with omeprazole, but is currently not taking it. She denies vomiting, hematemesis, melena, hematochezia, constipation, or diarrhea. There is no chest pain, but she does report fatigue over the past 48 hours, which prompted her daughter to bring her back to the ED 2 days ago. The patient was seen at this facility 3 days ago for similar symptoms, including epigastric pain and nausea, and has had decreased oral intake since then. Though she mentions a prior diagnosis of vertigo, it does not appear to be a formal or documented diagnosis in the chart. She also reports left lumbar back pain, which has been noted in prior evaluations. Review  of systems is otherwise negative for falls, trauma, weakness, paresthesias, or bowel/bladder incontinence. I reviewed the Social determinant of health affecting care:  Body mass index is 28.34 kg/m². with concerns of being overweight will consider access to nutritious food and physical activity opportunities and language barrier for which we used an  in order to obtain her history.  Exam shows elevated blood pressure, nonfocal exam, ill appearance without distress and normal abdominal exam.  Diagnosis or treatment significantly limited by social determinants of health.     Diff Dx after consideration of threat to life or bodily function includes but not exclusive to: worsening anemia, metabolic disturbances, poisoning, medication side effect, UTI, myocardial infarction, dehydration,CVA or peripheral nerve disorder.    Treatment plan includes history, physical exam, cardiac monitoring, labs, imaging studies, and supportive care.       ED Course     MDM  Reviewed: previous chart, nursing note and vitals  Reviewed previous: ECG, labs, CT scan and MRI  Interpretation: labs and ECG  Total time providing critical care: 30-74 minutes. This excludes time spent performing separately reportable procedures and services.  Consults: admitting MD    Data to be reviewed, analyzed and independently interpreted in ED course below    ED Course as of 06/28/25 0227   Sat Jun 28, 2025   0046 EKG 12-lead  EKG  Reviewed and independently interpreted:  No STEMI  Sinus tachycardia with a heart rate of 102  Nl conduction, nl intervals  Nl ST and T wave   No ectopy, Nl axis   [NO]   0103 Hematocrit(!): 34.8  Low end of normal otherwise relatively unremarkable CBC [NO]   0103 BP(!): 203/100  Improved from triage vitals [NO]   0103 SpO2: 100 %  Interpreted as normal by emergency medicine physician.  Interventions none.   [NO]   0107 Patient evaluated in August of 2024 and 2 other times this week for symptoms similar to what she is  currently complaining of. [NO]   0107 BP(!): 184/81  Improved from triage vitals [NO]   0107 Pulse: 90  Improved from triage vitals [NO]   0141 TSH: 2.096  Unremarkable [NO]   0141 Lipase: 58  Unremarkable [NO]   0141 INR: 0.9  Unremarkable [NO]   0141 Magnesium : 2.2  Unremarkable [NO]   0141 Troponin I: 0.006  Unremarkable [NO]   0141 BNP(!): 142  Mildly elevated [NO]   0141 Sodium(!): 135  Low end of normal [NO]   0141 CO2(!): 19  Low  [NO]   0141 eGFR(!): 59  Low otherwise relatively unremarkable CMP [NO]   0141 CT Head Without Contrast  No acute intracranial hemorrhage [NO]   0211 Phosphorus Level(!): 1.8  Hypophosphatemia [NO]   0213 Patient reports persistent headache and some ear irritation.  She also reports having some nausea .  The results of physical exam, labs and imaging findings were reviewed with the patient and daughter.  This discussion included but not exclusive to the risk to the patient due to the potential underlying pathology, the testing that was required to make the diagnosis, and the treatment administered or prescribed.  We will give medication and replete phosphorus level.  Patient would likely benefit from being admitted to the hospital. Admit team contacted for review [NO]      ED Course User Index  [NO] Princess Pemberton MD     REASSESSMENT: Patient is tolerating p.o. and ambulating with steady gait.   Sx improved after treatment with: Drug therapy requiring intensive monitoring for toxicity    Medications   ketorolac injection 15 mg (has no administration in time range)   dexAMETHasone injection 4 mg (has no administration in time range)   ondansetron injection 4 mg (has no administration in time range)   potassium, sodium phosphates 280-160-250 mg packet 1 packet (has no administration in time range)   labetaloL injection 10 mg (10 mg Intravenous Given 6/28/25 0055)            CLINICAL IMPRESSION     1. Hypertensive urgency    2. Upper abdominal pain    3. Acute focal neurological  deficit        Clinical Impression:  Final diagnoses:  [R10.10] Upper abdominal pain  [R29.818] Acute focal neurological deficit  [I16.0] Hypertensive urgency (Primary)          ED Disposition Condition    Observation                           [1]  Social History  Tobacco Use    Smoking status: Never    Smokeless tobacco: Never   Substance Use Topics    Alcohol use: Never    Drug use: Never   [2]  No current facility-administered medications on file prior to encounter.     Current Outpatient Medications on File Prior to Encounter   Medication Sig Dispense Refill    acetaminophen (TYLENOL) 500 MG tablet Take 1 tablet (500 mg total) by mouth every 4 (four) hours as needed. 20 tablet 0    albuterol (PROVENTIL/VENTOLIN HFA) 90 mcg/actuation inhaler Inhale 1-2 puffs into the lungs every 6 (six) hours as needed for Wheezing. Rescue 6.7 g 1    aluminum & magnesium hydroxide-simethicone (MAALOX MAXIMUM STRENGTH) 400-400-40 mg/5 mL suspension Take 15 mLs by mouth every 6 (six) hours as needed for Indigestion. 335 mL 1    azithromycin (Z-PINKY) 250 MG tablet Take 1 tablet (250 mg total) by mouth once daily. Take first 2 tablets together, then 1 every day until finished. 6 tablet 0    cetirizine (ZYRTEC) 10 MG tablet Take 1 tablet (10 mg total) by mouth once daily. 30 tablet 0    famotidine (PEPCID) 20 MG tablet Take 1 tablet (20 mg total) by mouth 2 (two) times daily. 60 tablet 1    fluticasone propionate (FLONASE) 50 mcg/actuation nasal spray 1 spray (50 mcg total) by Each Nostril route 2 (two) times daily as needed for Rhinitis. 15 g 0    ibuprofen (ADVIL,MOTRIN) 600 MG tablet Take 1 tablet (600 mg total) by mouth every 6 (six) hours as needed for Pain or Temperature greater than (100.5 or greater). 30 tablet 0    LIDOcaine (LIDODERM) 5 % Place 1 patch onto the skin once daily. Remove & Discard patch within 12 hours or as directed by MD. May use 4% over the counter if not covered by insurance for 15 days 15 patch  0    olmesartan (BENICAR) 40 MG tablet Take 1 tablet (40 mg total) by mouth once daily. 30 tablet 0    omeprazole (PRILOSEC) 20 MG capsule Take 1 capsule (20 mg total) by mouth once daily. 30 capsule 0    ondansetron (ZOFRAN) 4 MG tablet Take 1 tablet (4 mg total) by mouth every 8 (eight) hours as needed for Nausea. 12 tablet 0    oxyCODONE (ROXICODONE) 5 MG immediate release tablet Take 1 tablet (5 mg total) by mouth every 8 (eight) hours as needed for Pain. 7 tablet 0

## 2025-06-28 NOTE — PROGRESS NOTES
[facility] Medicine  Progress Note    Patient Name: Monique Merchant  MRN: [id number]  Patient Class: OP- Observation   Admission Date: 6/28/2025  Length of Stay: 0 days  Attending Physician: Miguelito Vaughn MD  Primary Care Provider: Unable, To Obtain        Subjective     Principal Problem:Epigastric pain        HPI:  This is a 75-year-old female with a past medical history of hyperlipidemia, GERD, anxiety, who presents with headache.     Patient presents for evaluation of headaches, abdominal pain and dizziness that started about 3 days prior to presentation.  She reports that she has these symptoms chronically, but recently they became worse.  Patient reports epigastric abdominal pain without radiation, associated with nausea. She believes this is her gastritis pain.  She also reports headaches with subjective photophobia, and dizziness.  She believes that her symptoms worsened after eating Tamales last week.  Patient initially presented to the ED on 06/25 with these symptoms.  CT head and CT abdomen/pelvis showed no acute process.  She was treated symptomatically and was discharged to follow-up with gastroenterology.  She returned to the ED on 06/26 for evaluation of melena, headaches, dizziness and generalized weakness.  Her workup was unremarkable, and she was again treated symptomatically.    In the ED, the patient was hypertensive (up to 225/110).  Labs were remarkable for hypophosphatemia (1.8), low CO2 (19), elevated BNP (142).  CT head showed no acute process.  MRI brain showed no acute process.  Patient was given potassium, sodium phosphate, Zofran 4 mg IV, labetalol 10 mg IV, Toradol 15 mg IV, Decadron 4 mg IV.  She was admitted for further management.     ID#     Overview/Hospital Course:  No notes on file    Interval History: continued headache and associated nausea with abdominal gas pains. Language line  ID    Review of Systems    Constitutional:  Negative for chills and fever.   Eyes:  Negative for photophobia and visual disturbance.   Respiratory:  Negative for chest tightness and shortness of breath.    Cardiovascular:  Negative for chest pain and palpitations.   Gastrointestinal:  Positive for nausea. Negative for abdominal pain and vomiting.   Genitourinary:  Negative for dysuria and hematuria.   Neurological:  Positive for headaches.     Objective:     Vital Signs (Most Recent):  Temp: 98.1 °F (36.7 °C) (06/28/25 1112)  Pulse: 87 (06/28/25 1112)  Resp: 20 (06/28/25 1112)  BP: (!) 150/77 (06/28/25 1112)  SpO2: 96 % (06/28/25 1112) Vital Signs (24h Range):  Temp:  [97.9 °F (36.6 °C)-98.5 °F (36.9 °C)] 98.1 °F (36.7 °C)  Pulse:  [] 87  Resp:  [10-22] 20  SpO2:  [96 %-100 %] 96 %  BP: (137-225)/() 150/77     Weight: 65.1 kg (143 lb 8 oz)  Body mass index is 27.11 kg/m².    Intake/Output Summary (Last 24 hours) at 6/28/2025 1124  Last data filed at 6/28/2025 0814  Gross per 24 hour   Intake 0 ml   Output 300 ml   Net -300 ml         Physical Exam  Vitals and nursing note reviewed.   Constitutional:       General: She is not in acute distress.     Appearance: She is well-developed. She is not diaphoretic.   HENT:      Head: Normocephalic and atraumatic.      Right Ear: External ear normal.      Left Ear: External ear normal.   Eyes:      General:         Right eye: No discharge.         Left eye: No discharge.      Conjunctiva/sclera: Conjunctivae normal.   Neck:      Thyroid: No thyromegaly.   Cardiovascular:      Rate and Rhythm: Normal rate and regular rhythm.      Heart sounds: No murmur heard.  Pulmonary:      Effort: Pulmonary effort is normal. No respiratory distress.      Breath sounds: Normal breath sounds.   Abdominal:      General: Bowel sounds are normal. There is no distension.      Palpations: Abdomen is soft. There is no mass.      Tenderness: There is no abdominal tenderness.   Musculoskeletal:         General:  No deformity.      Cervical back: Normal range of motion and neck supple.      Right lower leg: No edema.      Left lower leg: No edema.   Skin:     General: Skin is warm and dry.   Neurological:      Mental Status: She is alert and oriented to person, place, and time.      Sensory: No sensory deficit.   Psychiatric:         Mood and Affect: Mood normal.         Behavior: Behavior normal.               Significant Labs: CBC:   Recent Labs   Lab 06/28/25  0047 06/28/25  0510   WBC 9.12 8.44   HGB 12.2 11.5*   HCT 34.8* 33.7*    268     CMP:   Recent Labs   Lab 06/28/25  0047 06/28/25  0511   * 133*   K 4.1 4.4    104   CO2 19* 18*   GLU 98 130*   BUN 13 13   CREATININE 1.0 1.0   CALCIUM 9.5 8.9   PROT 8.2 7.7   ALBUMIN 4.1 3.8   BILITOT 0.3 0.3   ALKPHOS 53 44   AST 26 25   ALT 16 16   ANIONGAP 12 11     Cardiac Markers:   Recent Labs   Lab 06/28/25 0047   *     Troponin:   Recent Labs   Lab 06/28/25 0047   TROPONINI 0.006       Significant Imaging:   Imaging Results              MRI Brain Without Contrast (Final result)  Result time 06/28/25 02:42:25      Final result by River Ortiz MD (06/28/25 02:42:25)                   Impression:      There is no evidence for acute intracranial process.      Electronically signed by: River Ortiz  Date:    06/28/2025  Time:    02:42               Narrative:    EXAMINATION:  MRI BRAIN WITHOUT CONTRAST    CLINICAL HISTORY:  Dizziness, persistent/recurrent, cardiac or vascular cause suspected;    TECHNIQUE:  Multiplanar multisequence MR imaging of the brain was performed without contrast.    COMPARISON:  CT examination of the brain June 28, 2025    FINDINGS:  The ventricular system, sulcal pattern and parenchymal signal characteristics appear appropriate for age.  There is no evidence for intracranial mass or mass effect.  There is no evidence for midline shift.  On diffusion imaging there is no evidence for abnormal diffusion signal  hyperintensity to specifically suggest recent or acute ischemia/infarct or other cause for restricted diffusion.    Near empty sella configuration is noted.  Otherwise appropriate CSF spaces are noted at the skull base.  Appropriate flow voids at the skull base are noted.    The visualized orbits appear intact.  Mild paranasal sinus mucosal thickening is noted.  The mastoid air cells demonstrate appropriate signal void.                                       CT Head Without Contrast (Final result)  Result time 06/28/25 01:21:23      Final result by Gayatri Mclaughlin MD (06/28/25 01:21:23)                   Impression:      No acute intracranial abnormalities identified.      Electronically signed by: Gayatri Mclaughlin MD  Date:    06/28/2025  Time:    01:21               Narrative:    EXAMINATION:  CT HEAD WITHOUT CONTRAST    CLINICAL HISTORY:  Headache, new or worsening (Age >= 50y);    TECHNIQUE:  Low dose axial images were obtained through the head.  Coronal and sagittal reformations were also performed. Contrast was not administered.    COMPARISON:  Recent CT head 06/25/2025.    FINDINGS:  No evidence of acute/recent major vascular distribution cerebral infarction, intraparenchymal hemorrhage, or intra-axial space occupying lesion. The ventricular system is normal in size and configuration with no evidence of hydrocephalus. No effacement of the skull-base cisterns.  Empty sella configuration is noted.  No abnormal extra-axial fluid collections or blood products. Visualized paranasal sinuses and mastoid air cells are clear. The calvarium shows no significant abnormality.                                          Assessment & Plan  Epigastric pain  Likely related to gastritis/PUD, or a symptom of migraine as she reports headache began first. Denies NSAID use for headache.  CT abdomen and pelvis (6/25):  No acute abnormality. Mild hepatomegaly. Diverticulosis of the colon.  Symptomatic control   Consult GI  Primary  hypertension  Patient's blood pressure range in the last 24 hours was: BP  Min: 137/63  Max: 225/110.The patient's inpatient anti-hypertensive regimen is listed below:  Current Antihypertensives  valsartan tablet 320 mg, Daily, Oral  labetaloL injection 10 mg, Every 6 hours PRN, Intravenous  hydrALAZINE injection 5 mg, Every 6 hours PRN, Intravenous    Plan  - BP is controlled, no changes needed to their regimen  Has had difficulty controlling BP per outpt PCP notes. Family and patient report CCB caused ankle swelling. Substitute home olmesartan for valsartan while inpt. Unclear if BP causing headache or headache elevating BP. Will add scheduled hydralazine.   Hypophosphatemia  Patient's most recent phosphorus results are listed below.   Recent Labs     06/26/25  0618 06/28/25  0047 06/28/25  0511   PHOS 2.1* 1.8* 2.0*     Plan  - Replace PRN  Frequent headaches  Likely tension headaches vs migraine. Reports has had episodic headaches for many years  CT head/MRI brain without acute process  Symptomatic control   Trial of fioricet for symptoms  Outpatient neurology referral for migraine management    VTE Risk Mitigation (From admission, onward)           Ordered     enoxaparin injection 40 mg  Daily         06/28/25 0344     IP VTE HIGH RISK PATIENT  Once         06/28/25 0344     Place sequential compression device  Until discontinued         06/28/25 0344                    Discharge Planning   SAVANNA:      Code Status: Full Code   Medical Readiness for Discharge Date:   Discharge Plan A: Home with family (Pt lives with her daughter; discharge with instruction for follow up)          Edgar Choudhary PA-C  Department of Hospital Medicine   [facility]

## 2025-06-28 NOTE — PLAN OF CARE
Problem: Adult Inpatient Plan of Care  Goal: Plan of Care Review  Outcome: Progressing  Goal: Patient-Specific Goal (Individualized)  Outcome: Progressing  Goal: Absence of Hospital-Acquired Illness or Injury  Outcome: Progressing  Goal: Optimal Comfort and Wellbeing  Outcome: Progressing  Goal: Readiness for Transition of Care  Outcome: Progressing     Problem: Infection  Goal: Absence of Infection Signs and Symptoms  Outcome: Progressing     Problem: Pain Acute  Goal: Optimal Pain Control and Function  Outcome: Progressing     Problem: Fall Injury Risk  Goal: Absence of Fall and Fall-Related Injury  Outcome: Progressing     Problem: Nausea and Vomiting  Goal: Nausea and Vomiting Relief  Outcome: Progressing     Problem: Comorbidity Management  Goal: Blood Pressure in Desired Range  Outcome: Progressing

## 2025-06-28 NOTE — CONSULTS
"[facility] Consult Note    Patient Name: Monique Merchant  MRN: [id number]  Admission Date: 6/28/2025  Hospital Length of Stay: 0 days  Code Status: Full Code   Consulting Provider: Len Tanner MD  Primary Care Physician: Unable, To Obtain  Principal Problem:Epigastric pain    Inpatient consult to Gastroenterology  Consult performed by: Len Tanner MD  Consult ordered by: Alok Horne MD        HPI:  75 y.o. female who presented to [facility] on 6/28/25 for evaluation of headache and nausea. GI consulted for evaluation of epigastric abdominal pain.    Patient seen today with her son and daughter present in room,  was used to obtain history. Reports issues with epigastric abdominal pain but believes it worsened after eating tamales last week. Reports being diagnosis with "gastritis" in the past and last EGD was approx 3 years ago. Had a positive FIT test in 2024 and underwent a colonoscopy at [facility] and 1mm cecal polyp was removed.    Imaging:    CT abdomen pelvis with IV contrast 6/25/25  Impression:  1. No acute abnormality.  2. Mild hepatomegaly.  3. Diverticulosis of the colon.  4. See above comments also.     Medical History:  has a past medical history of Arthritis and Hypertension.    Surgical History:  has no past surgical history on file.    Family History: family history is not on file..     Social History:  reports that she has never smoked. She has never used smokeless tobacco. She reports that she does not drink alcohol and does not use drugs.    Medications Ordered Prior to Encounter[1]    Review of patient's allergies indicates:  No Known Allergies       Objective Findings:    Vital Signs:  BP (!) 190/79 (BP Location: Right arm, Patient Position: Lying)   Pulse 98   Temp 98.1 °F (36.7 °C) (Oral)   Resp 20   Ht 5' 1" (1.549 m)   Wt 65.1 kg (143 lb 8 oz)   SpO2 97%   BMI 27.11 kg/m²   Body mass index is 27.11 kg/m².    Physical Exam:  General Appearance: " Well appearing in no acute distress  Head: Normocephalic, without obvious abnormality  Lungs: Non-labored breathing  Abdomen: Soft, non tender, non distended     Laboratory:    MELD 3.0: 11 at 6/28/2025  5:11 AM  MELD-Na: 6 at 6/28/2025  5:11 AM  Calculated from:  Serum Creatinine: 1 mg/dL at 6/28/2025  5:11 AM  Serum Sodium: 133 mmol/L at 6/28/2025  5:11 AM  Total Bilirubin: 0.3 mg/dL (Using min of 1 mg/dL) at 6/28/2025  5:11 AM  Serum Albumin: 3.8 g/dL (Using max of 3.5 g/dL) at 6/28/2025  5:11 AM  INR(ratio): 0.9 (Using min of 1) at 6/28/2025 12:47 AM  Age at listing (hypothetical): 75 years  Sex: Female at 6/28/2025  5:11 AM      Recent Labs   Lab 06/26/25  0618 06/28/25  0047 06/28/25  0510   HGB 11.6* 12.2 11.5*       Lab Results   Component Value Date    WBC 8.44 06/28/2025    HGB 11.5 (L) 06/28/2025    HCT 33.7 (L) 06/28/2025    MCV 91 06/28/2025     06/28/2025       Lab Results   Component Value Date     (L) 06/28/2025    K 4.4 06/28/2025     06/28/2025    CO2 18 (L) 06/28/2025    BUN 13 06/28/2025    CREATININE 1.0 06/28/2025    CALCIUM 8.9 06/28/2025    ANIONGAP 11 06/28/2025       Lab Results   Component Value Date    ALT 16 06/28/2025    AST 25 06/28/2025    ALKPHOS 44 06/28/2025    BILITOT 0.3 06/28/2025       Lab Results   Component Value Date    INR 0.9 06/28/2025    INR 1.0 06/26/2025       Assessment:  Epigastric abdominal pain  Headaches  HTN       Recommendations:  - Discussed that non-emergent EGD can be done for evaluation of abdominal pain. This can be done inpatient or outpatient. If patient remains in hospital can be done on 6/30/25.  - CT also showing central lumbar narrowing and potential chronic compression fracture at L1. Would consider PMR pain evaluation on discharge in case this is playing a role with her pain symptoms.    - Agree with IV PPI BID      Thank you for involving us in the care of Monique Merchant. Please call with any additional  questions, concerns or changes in the patient's clinical status.     Len Tanner MD  [facility]                      [1]   No current facility-administered medications on file prior to encounter.     Current Outpatient Medications on File Prior to Encounter   Medication Sig Dispense Refill    acetaminophen (TYLENOL) 500 MG tablet Take 1 tablet (500 mg total) by mouth every 4 (four) hours as needed. 20 tablet 0    alendronate (FOSAMAX) 70 MG tablet Take 70 mg by mouth every 7 days.      aluminum & magnesium hydroxide-simethicone (MAALOX MAXIMUM STRENGTH) 400-400-40 mg/5 mL suspension Take 15 mLs by mouth every 6 (six) hours as needed for Indigestion. 335 mL 1    olmesartan (BENICAR) 40 MG tablet Take 1 tablet (40 mg total) by mouth once daily. 30 tablet 0    omeprazole (PRILOSEC) 20 MG capsule Take 1 capsule (20 mg total) by mouth once daily. 30 capsule 0    ondansetron (ZOFRAN) 4 MG tablet Take 1 tablet (4 mg total) by mouth every 8 (eight) hours as needed for Nausea. 12 tablet 0    oxyCODONE (ROXICODONE) 5 MG immediate release tablet Take 1 tablet (5 mg total) by mouth every 8 (eight) hours as needed for Pain. 7 tablet 0    albuterol (PROVENTIL/VENTOLIN HFA) 90 mcg/actuation inhaler Inhale 1-2 puffs into the lungs every 6 (six) hours as needed for Wheezing. Rescue 6.7 g 1    azithromycin (Z-PINKY) 250 MG tablet Take 1 tablet (250 mg total) by mouth once daily. Take first 2 tablets together, then 1 every day until finished. 6 tablet 0    cetirizine (ZYRTEC) 10 MG tablet Take 1 tablet (10 mg total) by mouth once daily. 30 tablet 0    famotidine (PEPCID) 20 MG tablet Take 1 tablet (20 mg total) by mouth 2 (two) times daily. 60 tablet 1    fluticasone propionate (FLONASE) 50 mcg/actuation nasal spray 1 spray (50 mcg total) by Each Nostril route 2 (two) times daily as needed for Rhinitis. 15 g 0    ibuprofen (ADVIL,MOTRIN) 600 MG tablet Take 1 tablet (600 mg total) by mouth every 6 (six) hours as needed for  Pain or Temperature greater than (100.5 or greater). 30 tablet 0    LIDOcaine (LIDODERM) 5 % Place 1 patch onto the skin once daily. Remove & Discard patch within 12 hours or as directed by MD. May use 4% over the counter if not covered by insurance for 15 days 15 patch 0

## 2025-06-28 NOTE — ASSESSMENT & PLAN NOTE
Likely related to gastritis/PUD  CT abdomen and pelvis (6/25):  No acute abnormality. Mild hepatomegaly. Diverticulosis of the colon.  Symptomatic control   Consult GI

## 2025-06-28 NOTE — SUBJECTIVE & OBJECTIVE
Interval History: continued headache and associated nausea with abdominal gas pains. Language line  ID    Review of Systems   Constitutional:  Negative for chills and fever.   Eyes:  Negative for photophobia and visual disturbance.   Respiratory:  Negative for chest tightness and shortness of breath.    Cardiovascular:  Negative for chest pain and palpitations.   Gastrointestinal:  Positive for nausea. Negative for abdominal pain and vomiting.   Genitourinary:  Negative for dysuria and hematuria.   Neurological:  Positive for headaches.     Objective:     Vital Signs (Most Recent):  Temp: 98.1 °F (36.7 °C) (06/28/25 1112)  Pulse: 87 (06/28/25 1112)  Resp: 20 (06/28/25 1112)  BP: (!) 150/77 (06/28/25 1112)  SpO2: 96 % (06/28/25 1112) Vital Signs (24h Range):  Temp:  [97.9 °F (36.6 °C)-98.5 °F (36.9 °C)] 98.1 °F (36.7 °C)  Pulse:  [] 87  Resp:  [10-22] 20  SpO2:  [96 %-100 %] 96 %  BP: (137-225)/() 150/77     Weight: 65.1 kg (143 lb 8 oz)  Body mass index is 27.11 kg/m².    Intake/Output Summary (Last 24 hours) at 6/28/2025 1124  Last data filed at 6/28/2025 0814  Gross per 24 hour   Intake 0 ml   Output 300 ml   Net -300 ml         Physical Exam  Vitals and nursing note reviewed.   Constitutional:       General: She is not in acute distress.     Appearance: She is well-developed. She is not diaphoretic.   HENT:      Head: Normocephalic and atraumatic.      Right Ear: External ear normal.      Left Ear: External ear normal.   Eyes:      General:         Right eye: No discharge.         Left eye: No discharge.      Conjunctiva/sclera: Conjunctivae normal.   Neck:      Thyroid: No thyromegaly.   Cardiovascular:      Rate and Rhythm: Normal rate and regular rhythm.      Heart sounds: No murmur heard.  Pulmonary:      Effort: Pulmonary effort is normal. No respiratory distress.      Breath sounds: Normal breath sounds.   Abdominal:      General: Bowel sounds are normal. There is no distension.       Palpations: Abdomen is soft. There is no mass.      Tenderness: There is no abdominal tenderness.   Musculoskeletal:         General: No deformity.      Cervical back: Normal range of motion and neck supple.      Right lower leg: No edema.      Left lower leg: No edema.   Skin:     General: Skin is warm and dry.   Neurological:      Mental Status: She is alert and oriented to person, place, and time.      Sensory: No sensory deficit.   Psychiatric:         Mood and Affect: Mood normal.         Behavior: Behavior normal.               Significant Labs: CBC:   Recent Labs   Lab 06/28/25 0047 06/28/25  0510   WBC 9.12 8.44   HGB 12.2 11.5*   HCT 34.8* 33.7*    268     CMP:   Recent Labs   Lab 06/28/25 0047 06/28/25  0511   * 133*   K 4.1 4.4    104   CO2 19* 18*   GLU 98 130*   BUN 13 13   CREATININE 1.0 1.0   CALCIUM 9.5 8.9   PROT 8.2 7.7   ALBUMIN 4.1 3.8   BILITOT 0.3 0.3   ALKPHOS 53 44   AST 26 25   ALT 16 16   ANIONGAP 12 11     Cardiac Markers:   Recent Labs   Lab 06/28/25 0047   *     Troponin:   Recent Labs   Lab 06/28/25 0047   TROPONINI 0.006       Significant Imaging:   Imaging Results              MRI Brain Without Contrast (Final result)  Result time 06/28/25 02:42:25      Final result by River Ortiz MD (06/28/25 02:42:25)                   Impression:      There is no evidence for acute intracranial process.      Electronically signed by: River Ortiz  Date:    06/28/2025  Time:    02:42               Narrative:    EXAMINATION:  MRI BRAIN WITHOUT CONTRAST    CLINICAL HISTORY:  Dizziness, persistent/recurrent, cardiac or vascular cause suspected;    TECHNIQUE:  Multiplanar multisequence MR imaging of the brain was performed without contrast.    COMPARISON:  CT examination of the brain June 28, 2025    FINDINGS:  The ventricular system, sulcal pattern and parenchymal signal characteristics appear appropriate for age.  There is no evidence for intracranial mass or  mass effect.  There is no evidence for midline shift.  On diffusion imaging there is no evidence for abnormal diffusion signal hyperintensity to specifically suggest recent or acute ischemia/infarct or other cause for restricted diffusion.    Near empty sella configuration is noted.  Otherwise appropriate CSF spaces are noted at the skull base.  Appropriate flow voids at the skull base are noted.    The visualized orbits appear intact.  Mild paranasal sinus mucosal thickening is noted.  The mastoid air cells demonstrate appropriate signal void.                                       CT Head Without Contrast (Final result)  Result time 06/28/25 01:21:23      Final result by Gayatri Mclaughlin MD (06/28/25 01:21:23)                   Impression:      No acute intracranial abnormalities identified.      Electronically signed by: Gayatri Mclaughlin MD  Date:    06/28/2025  Time:    01:21               Narrative:    EXAMINATION:  CT HEAD WITHOUT CONTRAST    CLINICAL HISTORY:  Headache, new or worsening (Age >= 50y);    TECHNIQUE:  Low dose axial images were obtained through the head.  Coronal and sagittal reformations were also performed. Contrast was not administered.    COMPARISON:  Recent CT head 06/25/2025.    FINDINGS:  No evidence of acute/recent major vascular distribution cerebral infarction, intraparenchymal hemorrhage, or intra-axial space occupying lesion. The ventricular system is normal in size and configuration with no evidence of hydrocephalus. No effacement of the skull-base cisterns.  Empty sella configuration is noted.  No abnormal extra-axial fluid collections or blood products. Visualized paranasal sinuses and mastoid air cells are clear. The calvarium shows no significant abnormality.

## 2025-06-28 NOTE — NURSING
Report received, care assumed. Resting in bed quietly. AAO, responsive to verbal stimuli. Denies pain/discomfort. Board updated. Plan of care discussed with patient.  [facility] [place]  Nurses Note -- 4 Eyes      6/28/2025       Skin assessed on: Q Shift      [x] No Pressure Injuries Present    [x]Prevention Measures Documented    [] Yes LDA  for Pressure Injury Previously documented     [] Yes New Pressure Injury Discovered   [] LDA for New Pressure Injury Added      Attending RN:  Isi Szymanski, RN     Second RN:  Kortney MELÉNDEZ

## 2025-06-28 NOTE — ASSESSMENT & PLAN NOTE
Patient's blood pressure range in the last 24 hours was: BP  Min: 137/63  Max: 225/110.The patient's inpatient anti-hypertensive regimen is listed below:  Current Antihypertensives  valsartan tablet 320 mg, Daily, Oral  labetaloL injection 10 mg, Every 6 hours PRN, Intravenous  hydrALAZINE injection 5 mg, Every 6 hours PRN, Intravenous    Plan  - BP is controlled, no changes needed to their regimen  Has had difficulty controlling BP per outpt PCP notes. Family and patient report CCB caused ankle swelling. Substitute home olmesartan for valsartan while inpt. Unclear if BP causing headache or headache elevating BP. Will add scheduled hydralazine.

## 2025-06-28 NOTE — PROGRESS NOTES
06/28/25 0457   Admission   Initial VN Admission Questions Complete   Communication Issues? Patient Hearing   Shift   Virtual Nurse - Patient Verbalized Approval Of Camera Use;VN Rounding   Safety/Activity   Patient Rounds bed in low position;call light in patient/parent reach;clutter free environment maintained;visualized patient;placement of personal items at bedside   Safety Promotion/Fall Prevention assistive device/personal item within reach;Fall Risk reviewed with patient/family;side rails raised x 2   Positioning   Body Position supine   Head of Bed (HOB) Positioning HOB at 30-45 degrees     VN cued in to pt's room with permission. Daughter at bedside. Pt and daughter [place] speaking.  Giovanni (id # [id number]) via vidyo connect monitor utilized for all translation needs. Admission questions completed with pt and daughter and plan of care reviewed.  Call bell w/in reach. Instructed to call for needs/assist oob.     Care plan initiated

## 2025-06-28 NOTE — PLAN OF CARE
06/28/25 1041   Discharge Planning   Assessment Type Discharge Planning Brief Assessment   Resource/Environmental Concerns none   Support Systems Children   Equipment Currently Used at Home none   Current Living Arrangements home   Patient/Family Anticipates Transition to home with family   Patient/Family Anticipated Services at Transition none   DME Needed Upon Discharge  none   Discharge Plan A Home with family  (Pt lives with her daughter; discharge with instruction for follow up)

## 2025-06-28 NOTE — HPI
This is a 75-year-old female with a past medical history of hyperlipidemia, GERD, anxiety, who presents with headache.     Patient presents for evaluation of headaches, abdominal pain and dizziness that started about 3 days prior to presentation.  She reports that she has these symptoms chronically, but recently they became worse.  Patient reports epigastric abdominal pain without radiation, associated with nausea. She believes this is her gastritis pain.  She also reports headaches with subjective photophobia, and dizziness.  She believes that her symptoms worsened after eating Tamales last week.  Patient initially presented to the ED on 06/25 with these symptoms.  CT head and CT abdomen/pelvis showed no acute process.  She was treated symptomatically and was discharged to follow-up with gastroenterology.  She returned to the ED on 06/26 for evaluation of melena, headaches, dizziness and generalized weakness.  Her workup was unremarkable, and she was again treated symptomatically.    In the ED, the patient was hypertensive (up to 225/110).  Labs were remarkable for hypophosphatemia (1.8), low CO2 (19), elevated BNP (142).  CT head showed no acute process.  MRI brain showed no acute process.  Patient was given potassium, sodium phosphate, Zofran 4 mg IV, labetalol 10 mg IV, Toradol 15 mg IV, Decadron 4 mg IV.  She was admitted for further management.     ID#

## 2025-06-28 NOTE — ASSESSMENT & PLAN NOTE
Patient's blood pressure range in the last 24 hours was: BP  Min: 137/63  Max: 225/110.The patient's inpatient anti-hypertensive regimen is listed below:  Current Antihypertensives  valsartan tablet 320 mg, Daily, Oral  labetaloL injection 10 mg, Every 6 hours PRN, Intravenous  hydrALAZINE injection 5 mg, Every 6 hours PRN, Intravenous    Plan  - BP is controlled, no changes needed to their regimen

## 2025-06-28 NOTE — NURSING
Pt transferred to bed with minimal assistance. Pt complained of 10/10 headache and stomach pain, along with being nauseated. Used  ID# .    [facility] [place]  Nurses Note -- 4 Eyes      6/28/2025       Skin assessed on: admit      [x] No Pressure Injuries Present    [x]Prevention Measures Documented    [] Yes LDA  for Pressure Injury Previously documented     [] Yes New Pressure Injury Discovered   [] LDA for New Pressure Injury Added      Attending RN:  Kortney Neil RN     Second RN:  BRADFORD Conner

## 2025-06-29 VITALS
HEIGHT: 61 IN | RESPIRATION RATE: 2 BRPM | OXYGEN SATURATION: 100 % | BODY MASS INDEX: 27.09 KG/M2 | HEART RATE: 82 BPM | WEIGHT: 143.5 LBS | TEMPERATURE: 98 F | DIASTOLIC BLOOD PRESSURE: 77 MMHG | SYSTOLIC BLOOD PRESSURE: 151 MMHG

## 2025-06-29 LAB
ABSOLUTE EOSINOPHIL (OHS): 0.03 K/UL
ABSOLUTE MONOCYTE (OHS): 1.23 K/UL (ref 0.3–1)
ABSOLUTE NEUTROPHIL COUNT (OHS): 7.8 K/UL (ref 1.8–7.7)
ALBUMIN SERPL BCP-MCNC: 3.6 G/DL (ref 3.5–5.2)
ALP SERPL-CCNC: 41 UNIT/L (ref 40–150)
ALT SERPL W/O P-5'-P-CCNC: 15 UNIT/L (ref 10–44)
ANION GAP (OHS): 11 MMOL/L (ref 8–16)
ANION GAP (OHS): 9 MMOL/L (ref 8–16)
AST SERPL-CCNC: 22 UNIT/L (ref 11–45)
BASOPHILS # BLD AUTO: 0.02 K/UL
BASOPHILS NFR BLD AUTO: 0.2 %
BILIRUB SERPL-MCNC: 0.3 MG/DL (ref 0.1–1)
BUN SERPL-MCNC: 17 MG/DL (ref 8–23)
BUN SERPL-MCNC: 19 MG/DL (ref 8–23)
CALCIUM SERPL-MCNC: 8.8 MG/DL (ref 8.7–10.5)
CALCIUM SERPL-MCNC: 8.8 MG/DL (ref 8.7–10.5)
CHLORIDE SERPL-SCNC: 103 MMOL/L (ref 95–110)
CHLORIDE SERPL-SCNC: 98 MMOL/L (ref 95–110)
CO2 SERPL-SCNC: 19 MMOL/L (ref 23–29)
CO2 SERPL-SCNC: 21 MMOL/L (ref 23–29)
CREAT SERPL-MCNC: 1 MG/DL (ref 0.5–1.4)
CREAT SERPL-MCNC: 1 MG/DL (ref 0.5–1.4)
ERYTHROCYTE [DISTWIDTH] IN BLOOD BY AUTOMATED COUNT: 12.1 % (ref 11.5–14.5)
GFR SERPLBLD CREATININE-BSD FMLA CKD-EPI: 59 ML/MIN/1.73/M2
GFR SERPLBLD CREATININE-BSD FMLA CKD-EPI: 59 ML/MIN/1.73/M2
GLUCOSE SERPL-MCNC: 90 MG/DL (ref 70–110)
GLUCOSE SERPL-MCNC: 96 MG/DL (ref 70–110)
HCT VFR BLD AUTO: 31.1 % (ref 37–48.5)
HGB BLD-MCNC: 10.7 GM/DL (ref 12–16)
HOLD SPECIMEN: NORMAL
IMM GRANULOCYTES # BLD AUTO: 0.08 K/UL (ref 0–0.04)
IMM GRANULOCYTES NFR BLD AUTO: 0.7 % (ref 0–0.5)
LYMPHOCYTES # BLD AUTO: 2.61 K/UL (ref 1–4.8)
MCH RBC QN AUTO: 30.8 PG (ref 27–31)
MCHC RBC AUTO-ENTMCNC: 34.4 G/DL (ref 32–36)
MCV RBC AUTO: 90 FL (ref 82–98)
NUCLEATED RBC (/100WBC) (OHS): 0 /100 WBC
OHS QRS DURATION: 70 MS
OHS QRS DURATION: 74 MS
OHS QTC CALCULATION: 440 MS
OHS QTC CALCULATION: 452 MS
PLATELET # BLD AUTO: 277 K/UL (ref 150–450)
PMV BLD AUTO: 10 FL (ref 9.2–12.9)
POTASSIUM SERPL-SCNC: 4.3 MMOL/L (ref 3.5–5.1)
POTASSIUM SERPL-SCNC: 4.5 MMOL/L (ref 3.5–5.1)
PROT SERPL-MCNC: 7 GM/DL (ref 6–8.4)
RBC # BLD AUTO: 3.47 M/UL (ref 4–5.4)
RELATIVE EOSINOPHIL (OHS): 0.3 %
RELATIVE LYMPHOCYTE (OHS): 22.2 % (ref 18–48)
RELATIVE MONOCYTE (OHS): 10.5 % (ref 4–15)
RELATIVE NEUTROPHIL (OHS): 66.1 % (ref 38–73)
SODIUM SERPL-SCNC: 128 MMOL/L (ref 136–145)
SODIUM SERPL-SCNC: 133 MMOL/L (ref 136–145)
TROPONIN I SERPL DL<=0.01 NG/ML-MCNC: 0.02 NG/ML
WBC # BLD AUTO: 11.77 K/UL (ref 3.9–12.7)

## 2025-06-29 PROCEDURE — 25000003 PHARM REV CODE 250: Performed by: PHYSICIAN ASSISTANT

## 2025-06-29 PROCEDURE — 25000003 PHARM REV CODE 250: Performed by: STUDENT IN AN ORGANIZED HEALTH CARE EDUCATION/TRAINING PROGRAM

## 2025-06-29 PROCEDURE — 96376 TX/PRO/DX INJ SAME DRUG ADON: CPT

## 2025-06-29 PROCEDURE — 36415 COLL VENOUS BLD VENIPUNCTURE: CPT | Performed by: PHYSICIAN ASSISTANT

## 2025-06-29 PROCEDURE — 84484 ASSAY OF TROPONIN QUANT: CPT | Performed by: PHYSICIAN ASSISTANT

## 2025-06-29 PROCEDURE — 63600175 PHARM REV CODE 636 W HCPCS: Performed by: STUDENT IN AN ORGANIZED HEALTH CARE EDUCATION/TRAINING PROGRAM

## 2025-06-29 PROCEDURE — 90471 IMMUNIZATION ADMIN: CPT | Performed by: HOSPITALIST

## 2025-06-29 PROCEDURE — 96361 HYDRATE IV INFUSION ADD-ON: CPT

## 2025-06-29 PROCEDURE — G0378 HOSPITAL OBSERVATION PER HR: HCPCS

## 2025-06-29 PROCEDURE — 63600175 PHARM REV CODE 636 W HCPCS: Performed by: HOSPITALIST

## 2025-06-29 PROCEDURE — 82310 ASSAY OF CALCIUM: CPT | Performed by: PHYSICIAN ASSISTANT

## 2025-06-29 PROCEDURE — 85025 COMPLETE CBC W/AUTO DIFF WBC: CPT | Performed by: PHYSICIAN ASSISTANT

## 2025-06-29 PROCEDURE — 80053 COMPREHEN METABOLIC PANEL: CPT | Performed by: PHYSICIAN ASSISTANT

## 2025-06-29 PROCEDURE — 90677 PCV20 VACCINE IM: CPT | Performed by: HOSPITALIST

## 2025-06-29 RX ORDER — TALC
6 POWDER (GRAM) TOPICAL NIGHTLY PRN
Qty: 60 TABLET | Refills: 0 | Status: ON HOLD | OUTPATIENT
Start: 2025-06-29

## 2025-06-29 RX ADMIN — SODIUM CHLORIDE 500 ML: 9 INJECTION, SOLUTION INTRAVENOUS at 10:06

## 2025-06-29 RX ADMIN — PNEUMOCOCCAL 20-VALENT CONJUGATE VACCINE 0.5 ML
2.2; 2.2; 2.2; 2.2; 2.2; 2.2; 2.2; 2.2; 2.2; 2.2; 2.2; 2.2; 2.2; 2.2; 2.2; 2.2; 4.4; 2.2; 2.2; 2.2 INJECTION, SUSPENSION INTRAMUSCULAR at 03:06

## 2025-06-29 RX ADMIN — PANTOPRAZOLE SODIUM 40 MG: 40 INJECTION, POWDER, FOR SOLUTION INTRAVENOUS at 09:06

## 2025-06-29 RX ADMIN — VALSARTAN 320 MG: 80 TABLET, FILM COATED ORAL at 09:06

## 2025-06-29 NOTE — ASSESSMENT & PLAN NOTE
Patient's blood pressure range in the last 24 hours was: BP  Min: 133/67  Max: 196/91.The patient's inpatient anti-hypertensive regimen is listed below:  Current Antihypertensives  valsartan tablet 320 mg, Daily, Oral  labetaloL injection 10 mg, Every 6 hours PRN, Intravenous  hydrALAZINE injection 5 mg, Every 6 hours PRN, Intravenous    Plan  - BP is controlled, no changes needed to their regimen  Has had difficulty controlling BP per outpt PCP notes. Family and patient report CCB caused ankle swelling. Substitute home olmesartan for valsartan while inpt. Unclear if BP causing headache or headache elevating BP. Will add scheduled hydralazine.

## 2025-06-29 NOTE — PLAN OF CARE
Recommendation:  1. Add Boost Breeze with meals.   2. Encourage intake at meals as tolerated. 3. Monitor weight/labs.   4. RD to follow to monitor diet tolerance.    Goals:  Pt will tolerate diet with at least 50% intake at meals by RD follow up  Nutrition Goal Status: new

## 2025-06-29 NOTE — NURSING
Report received, care assumed. Resting in bed quietly. AAO, responsive to verbal stimuli. Denies pain/discomfort. Board updated. Plan of care discussed with patient and daughter at bedside.  [facility] [place]  Nurses Note -- 4 Eyes      6/29/2025       Skin assessed on: Q Shift      [x] No Pressure Injuries Present    [x]Prevention Measures Documented    [] Yes LDA  for Pressure Injury Previously documented     [] Yes New Pressure Injury Discovered   [] LDA for New Pressure Injury Added      Attending RN:  Isi Szymanski RN     Second RN:  RIKA Jimenez

## 2025-06-29 NOTE — NURSING
Patient discharged home at this time. No distress noted. Declined wheelchair transport. Ambulated safely off unit with family members.  [facility]  Nurses Note -- 4 Eyes      6/29/2025       Skin assessed on: Discharge      [] No Pressure Injuries Present    []Prevention Measures Documented    [] Yes LDA  for Pressure Injury Previously documented     [] Yes New Pressure Injury Discovered   [] LDA for New Pressure Injury Added      Attending RN:  Isi Szymanski RN     Second RN:  THOMAS Mcdonnell

## 2025-06-29 NOTE — ASSESSMENT & PLAN NOTE
Patient's most recent phosphorus results are listed below.   Recent Labs     06/28/25  0047 06/28/25  0511   PHOS 1.8* 2.0*     Plan  - Replace PRN

## 2025-06-29 NOTE — PLAN OF CARE
Patient is awake alert and oriented. No complaints of pain N/V, CP, nor SOB. SBP maintained less than 150 this shift. Continue with plan of care as ordered. No distress noted. Daughter at the bedside all times.   Problem: Adult Inpatient Plan of Care  Goal: Plan of Care Review  Outcome: Progressing  Goal: Patient-Specific Goal (Individualized)  Outcome: Progressing  Goal: Optimal Comfort and Wellbeing  Outcome: Progressing  Goal: Readiness for Transition of Care  Outcome: Progressing     Problem: Pain Acute  Goal: Optimal Pain Control and Function  Outcome: Progressing     Problem: Fall Injury Risk  Goal: Absence of Fall and Fall-Related Injury  Outcome: Progressing     Problem: Nausea and Vomiting  Goal: Nausea and Vomiting Relief  Outcome: Progressing

## 2025-06-29 NOTE — NURSING
[facility]  Nurses Note -- 4 Eyes      6/29/2025       Skin assessed on: Q Shift      [x] No Pressure Injuries Present    []Prevention Measures Documented    [] Yes LDA  for Pressure Injury Previously documented     [] Yes New Pressure Injury Discovered   [] LDA for New Pressure Injury Added      Attending RN:  Barbara Sousa, RIKA     Second RN:  Isi Szymanski RN

## 2025-06-29 NOTE — CONSULTS
"  [facility] - Med Surg  Adult Nutrition  Consult Note    SUMMARY     Recommendations    Recommendation:  1. Add Boost Breeze with meals.   2. Encourage intake at meals as tolerated. 3. Monitor weight/labs.   4. RD to follow to monitor diet tolerance.    Goals:  Pt will tolerate diet with at least 50% intake at meals by RD follow up  Nutrition Goal Status: new  Communication of RD Recs: reviewed with RN    Nutrition Discharge Planning   Nutrition Discharge Planning: Therapeutic diet (comments)  Therapeutic diet (comments): GI soft    Assessment and Plan     Malnutrition Assessment  Weight Loss (Malnutrition):  (4% x 11 months)       Reason for Assessment  Reason For Assessment: consult (decreased appetite with 10lb weight loss)  Diagnosis:  (epigastric pain)  General Information Comments: Pt admitted with abd pain, dizziness, headache, and nausea. Pt on GI soft diet with poor intake at meals. Simon 21-skin intact. Noted 8lb weight loss x 11 months. Unable to assess NFPE 2/2 RD working remotely for weekend coverage    Nutrition/Diet History  Food Preferences: no Spiritism or cultural food prefs identified  Spiritual, Cultural Beliefs, Latter-day Practices, Values that Affect Care: no  Factors Affecting Nutritional Intake: nausea/vomiting, decreased appetite  Nutrition-related SDOH: Unable to assess at this time    Anthropometrics  Height: 5' 1" (154.9 cm)  Height (inches): 61 in  Height Method: Stated  Weight: 65.1 kg (143 lb 8.3 oz)  Weight (lb): 143.52 lb  Weight Method: Bed Scale  Ideal Body Weight (IBW), Female: 105 lb  % Ideal Body Weight, Female (lb): 136.69 %  BMI (Calculated): 27.1  BMI Grade: 25 - 29.9 - overweight  Usual Body Weight (UBW), k.5 kg ()  % Usual Body Weight: 95.24  % Weight Change From Usual Weight: -4.96 %     Lab/Procedures/Meds  Pertinent Labs Reviewed: reviewed  Pertinent Labs Comments: Na 133L, Glu 130H, Phos 2.0L  Pertinent Medications Reviewed: reviewed  Pertinent Medications " Comments: pantoprazole    Estimated/Assessed Needs  Weight Used For Calorie Calculations: 65.1 kg (143 lb 8.3 oz)  Energy Calorie Requirements (kcal): 1627 (25 kcal/kg)  Energy Need Method: Kcal/kg  Protein Requirements: 65g (1.0g/kg)  Weight Used For Protein Calculations: 65.1 kg (143 lb 8.3 oz)  Estimated Fluid Requirement Method: RDA Method  RDA Method (mL): 1627     Nutrition Prescription Ordered  Current Diet Order: GI soft    Evaluation of Received Nutrient/Fluid Intake  I/O: 240/0  Energy Calories Required: not meeting needs  Protein Required: not meeting needs  Fluid Required: not meeting needs  Comments: LBM 6/27  % Intake of Estimated Energy Needs: 0 - 25 %  % Meal Intake: 0 - 25 %  PES Statement  Inadequate protein energy intake related to Acute illness as evidenced by Intake <50% estimated needs, Nausea/vomiting (weight loss)  Status: New    Nutrition Risk  Level of Risk/Frequency of Follow-up:  (2xweekly)     Monitor and Evaluation  Monitor and Evaluation: Food and beverage intake, Diet order, Weight     Nutrition Follow-Up  RD Follow-up?: Yes

## 2025-06-29 NOTE — PLAN OF CARE
Case Management Final Discharge Note    Discharge Disposition: Home with Family     New DME ordered / company name: None    Relevant SDOH / Transition of Care Barriers:  None    Person available to provide assistance at home when needed and their contact information: Independent; family/ Son Sanchez [phone] assists as needed     Scheduled followup appointment: PCP- [facility] Pt. To call to schedule hospital follow up; information added to AVS     Referrals placed: Neurology & GI (Dr. Tanner)     Transportation: Private Vehicle, Family Transportation     Patient and family educated on discharge services and updated on DC plan. Bedside RN notified, patient clear to discharge from Case Management Perspective.     06/29/25 1213   Final Note   Assessment Type Final Discharge Note   Anticipated Discharge Disposition Home   What phone number can be called within the next 1-3 days to see how you are doing after discharge? [phone]   Hospital Resources/Appts/Education Provided Post-Acute resouces added to AVS   Post-Acute Status   Discharge Delays None known at this time

## 2025-06-29 NOTE — HOSPITAL COURSE
[place] Jessica Abdonjoi Merchant 75 y.o. female observation for headache abdominal pain and nausea.  She presented to the hospital with a headache with associated nausea and epigastric abdominal pain.  In the ER she is afebrile without leukocytosis MRI without evidence of infarct CT head without acute abnormality.  Her CT scan of abdomen 2 days prior had no acute abnormality.  Suspect her symptoms related to complex migraine as all symptoms resolved with Fioricet and resolution of headache.  Ambulatory referral to Neurology was placed given she reported frequent headaches that has been ongoing for many months.  At discharge she is asymptomatic and tolerating a diet. She has chronic mild hyponatremia and remains at her baseline at discharge sodium of 133.     Language line  ID# [id number] used for encounter.

## 2025-06-29 NOTE — DISCHARGE INSTRUCTIONS
.Our goal at [facility] is to always give you outstanding care and exceptional service. You may receive a survey from [employer] by mail, text or e-mail in the next 24-48 hours asking about the care you received with us. The survey should only take 5-10 minutes to complete and is very important to us.     Your feedback provides us with a way to recognize our staff who work tirelessly to provide the best care! Also, your responses help us learn how to improve when your experience was below our aspiration of excellence. We are always looking for ways to improve your stay. We WILL use your feedback to continue making improvements to help us provide the highest quality care. We keep your personal information and feedback confidential. We appreciate your time completing this survey and can't wait to hear from you!!!    We look forward to your continued care with us! Thanks so much for choosing [facility] for your healthcare needs!

## 2025-06-29 NOTE — DISCHARGE SUMMARY
[facility] Medicine  Discharge Summary      Patient Name: Monique Merchant  MRN: [id number]  LOPEZ: [id number]  Patient Class: OP- Observation  Admission Date: 6/28/2025  Hospital Length of Stay: 0 days  Discharge Date and Time: 06/29/2025 12:38 PM  Attending Physician: Miguelito Vaughn MD   Discharging Provider: Edgar Qureshi PA-C  Primary Care Provider: Unable, To Obtain    Primary Care Team: EDGAR QUREHSI    HPI:   This is a 75-year-old female with a past medical history of hyperlipidemia, GERD, anxiety, who presents with headache.     Patient presents for evaluation of headaches, abdominal pain and dizziness that started about 3 days prior to presentation.  She reports that she has these symptoms chronically, but recently they became worse.  Patient reports epigastric abdominal pain without radiation, associated with nausea. She believes this is her gastritis pain.  She also reports headaches with subjective photophobia, and dizziness.  She believes that her symptoms worsened after eating Tamales last week.  Patient initially presented to the ED on 06/25 with these symptoms.  CT head and CT abdomen/pelvis showed no acute process.  She was treated symptomatically and was discharged to follow-up with gastroenterology.  She returned to the ED on 06/26 for evaluation of melena, headaches, dizziness and generalized weakness.  Her workup was unremarkable, and she was again treated symptomatically.    In the ED, the patient was hypertensive (up to 225/110).  Labs were remarkable for hypophosphatemia (1.8), low CO2 (19), elevated BNP (142).  CT head showed no acute process.  MRI brain showed no acute process.  Patient was given potassium, sodium phosphate, Zofran 4 mg IV, labetalol 10 mg IV, Toradol 15 mg IV, Decadron 4 mg IV.  She was admitted for further management.     ID#     * No surgery found *      Hospital Course:   Monique Merchant  75 y.o. female observation for headache abdominal pain and nausea.  She presented to the hospital with a headache with associated nausea and epigastric abdominal pain.  In the ER she is afebrile without leukocytosis MRI without evidence of infarct CT head without acute abnormality.  Her CT scan of abdomen 2 days prior had no acute abnormality.  Suspect her symptoms related to complex migraine as all symptoms resolved with Fioricet and resolution of headache.  Ambulatory referral to Neurology was placed given she reported frequent headaches that has been ongoing for many months.  At discharge she is asymptomatic and tolerating a diet. She has chronic mild hyponatremia and remains at her baseline at discharge sodium of 133.     Language line  ID# [id number] used for encounter.      Goals of Care Treatment Preferences:  Code Status: Full Code         Consults:   Consults (From admission, onward)          Status Ordering Provider     Inpatient consult to Registered Dietitian/Nutritionist  Once        Provider:  (Not yet assigned)    CHERYL Dover     Inpatient consult to Gastroenterology  Once        Provider:  Len Tanner MD    Completed ELISABETH BENZ            Assessment & Plan  Epigastric pain  Likely related to gastritis/PUD, or a symptom of migraine as she reports headache began first. Denies NSAID use for headache.  CT abdomen and pelvis (6/25):  No acute abnormality. Mild hepatomegaly. Diverticulosis of the colon.  Symptomatic control   Consult GI  Primary hypertension  Patient's blood pressure range in the last 24 hours was: BP  Min: 133/67  Max: 196/91.The patient's inpatient anti-hypertensive regimen is listed below:  Current Antihypertensives  valsartan tablet 320 mg, Daily, Oral  labetaloL injection 10 mg, Every 6 hours PRN, Intravenous  hydrALAZINE injection 5 mg, Every 6 hours PRN, Intravenous    Plan  - BP is controlled, no changes needed to their regimen  Has had difficulty  controlling BP per outpt PCP notes. Family and patient report CCB caused ankle swelling. Substitute home olmesartan for valsartan while inpt. Unclear if BP causing headache or headache elevating BP. Will add scheduled hydralazine.   Hypophosphatemia  Patient's most recent phosphorus results are listed below.   Recent Labs     06/28/25  0047 06/28/25  0511   PHOS 1.8* 2.0*     Plan  - Replace PRN  Frequent headaches  Likely tension headaches vs migraine. Reports has had episodic headaches for many years  CT head/MRI brain without acute process  Symptomatic control   Trial of fioricet for symptoms  Outpatient neurology referral for migraine management    Final Active Diagnoses:    Diagnosis Date Noted POA    PRINCIPAL PROBLEM:  Epigastric pain [R10.13] 06/28/2025 Unknown    Primary hypertension [I10] 06/28/2025 Yes    Hypophosphatemia [E83.39] 06/28/2025 Yes    Frequent headaches [R51.9] 06/28/2025 Unknown      Problems Resolved During this Admission:       Discharged Condition: stable    Disposition: Home or Self Care    Follow Up:   Follow-up Information       [facility] -. Schedule an appointment as soon as possible for a visit in 1 week(s).    Contact information:  [address] [place] [address]  [phone]               Christina Dutton MD. Call in 1 day(s).    Specialty: Otolaryngology  Contact information:  [address] [place] [address]  [phone]               Harleen Neil MD. Call in 1 day(s).    Specialty: Neurology  Contact information:  [address]  [place] [address]  [phone]                           Patient Instructions:      Diet Adult Regular     Notify your health care provider if you experience any of the following:  temperature >100.4     Notify your health care provider if you experience any of the following:  persistent nausea and vomiting or diarrhea     Notify your health care provider if you experience any of the following:  increased  confusion or weakness     Notify your health care provider if you experience any of the following:  persistent dizziness, light-headedness, or visual disturbances     Activity as tolerated       Significant Diagnostic Studies: Labs: CMP   Recent Labs   Lab 06/28/25  0047 06/28/25  0511 06/29/25  0255   * 133* 128*   K 4.1 4.4 4.5    104 98   CO2 19* 18* 19*   GLU 98 130* 96   BUN 13 13 17   CREATININE 1.0 1.0 1.0   CALCIUM 9.5 8.9 8.8   PROT 8.2 7.7 7.0   ALBUMIN 4.1 3.8 3.6   BILITOT 0.3 0.3 0.3   ALKPHOS 53 44 41   AST 26 25 22   ALT 16 16 15   ANIONGAP 12 11 11    and CBC   Recent Labs   Lab 06/28/25  0047 06/28/25  0510 06/29/25  0255   WBC 9.12 8.44 11.77   HGB 12.2 11.5* 10.7*   HCT 34.8* 33.7* 31.1*    268 277       Pending Diagnostic Studies:       Procedure Component Value Units Date/Time    EKG 12-lead [[id number]]     Order Status: Sent Lab Status: No result            Medications:  Reconciled Home Medications:      Medication List        START taking these medications      melatonin 3 mg tablet  Commonly known as: MELATIN  Take 2 tablets (6 mg total) by mouth nightly as needed for Insomnia.            CONTINUE taking these medications      acetaminophen 500 MG tablet  Commonly known as: TYLENOL  Take 1 tablet (500 mg total) by mouth every 4 (four) hours as needed.     albuterol 90 mcg/actuation inhaler  Commonly known as: PROVENTIL/VENTOLIN HFA  Inhale 1-2 puffs into the lungs every 6 (six) hours as needed for Wheezing. Rescue     alendronate 70 MG tablet  Commonly known as: FOSAMAX  Take 70 mg by mouth every 7 days.     aluminum & magnesium hydroxide-simethicone 400-400-40 mg/5 mL suspension  Commonly known as: MAALOX MAXIMUM STRENGTH  Take 15 mLs by mouth every 6 (six) hours as needed for Indigestion.     cetirizine 10 MG tablet  Commonly known as: ZYRTEC  Take 1 tablet (10 mg total) by mouth once daily.     famotidine 20 MG tablet  Commonly known as: PEPCID  Take 1 tablet (20 mg  total) by mouth 2 (two) times daily.     fluticasone propionate 50 mcg/actuation nasal spray  Commonly known as: FLONASE  1 spray (50 mcg total) by Each Nostril route 2 (two) times daily as needed for Rhinitis.     ibuprofen 600 MG tablet  Commonly known as: ADVIL,MOTRIN  Take 1 tablet (600 mg total) by mouth every 6 (six) hours as needed for Pain or Temperature greater than (100.5 or greater).     LIDOcaine 5 %  Commonly known as: LIDODERM  Place 1 patch onto the skin once daily. Remove & Discard patch within 12 hours or as directed by MD. May use 4% over the counter if not covered by insurance for 15 days     olmesartan 40 MG tablet  Commonly known as: BENICAR  Take 1 tablet (40 mg total) by mouth once daily.     omeprazole 20 MG capsule  Commonly known as: PRILOSEC  Take 1 capsule (20 mg total) by mouth once daily.     ondansetron 4 MG tablet  Commonly known as: ZOFRAN  Take 1 tablet (4 mg total) by mouth every 8 (eight) hours as needed for Nausea.     oxyCODONE 5 MG immediate release tablet  Commonly known as: ROXICODONE  Take 1 tablet (5 mg total) by mouth every 8 (eight) hours as needed for Pain.            STOP taking these medications      azithromycin 250 MG tablet  Commonly known as: Z-PINKY              Indwelling Lines/Drains at time of discharge:   Lines/Drains/Airways       None                       Time spent on the discharge of patient: 37 minutes         Edgar Choudhary PA-C  Department of Hospital Medicine  [facility] [place]

## 2025-06-30 ENCOUNTER — TELEPHONE (OUTPATIENT)
Dept: ADMINISTRATIVE | Facility: OTHER | Age: 75
End: 2025-06-30
Payer: COMMERCIAL

## 2025-07-02 ENCOUNTER — TELEPHONE (OUTPATIENT)
Dept: ENDOSCOPY | Facility: HOSPITAL | Age: 75
End: 2025-07-02
Payer: COMMERCIAL

## 2025-07-02 DIAGNOSIS — R10.13 EPIGASTRIC PAIN: Primary | ICD-10-CM

## 2025-07-02 NOTE — TELEPHONE ENCOUNTER
Patient is scheduled for a Upper Endoscopy (EGD) on 7/10/25 with Dr. SOHEILA Mullins  Referral for procedure from [facility]

## 2025-07-05 ENCOUNTER — HOSPITAL ENCOUNTER (INPATIENT)
Facility: HOSPITAL | Age: 75
LOS: 3 days | Discharge: HOME | DRG: 641 | End: 2025-07-08
Attending: STUDENT IN AN ORGANIZED HEALTH CARE EDUCATION/TRAINING PROGRAM | Admitting: STUDENT IN AN ORGANIZED HEALTH CARE EDUCATION/TRAINING PROGRAM
Payer: COMMERCIAL

## 2025-07-05 DIAGNOSIS — R10.13 ABDOMINAL PAIN, CHRONIC, EPIGASTRIC: ICD-10-CM

## 2025-07-05 DIAGNOSIS — K85.90 ACUTE PANCREATITIS, UNSPECIFIED COMPLICATION STATUS, UNSPECIFIED PANCREATITIS TYPE: ICD-10-CM

## 2025-07-05 DIAGNOSIS — I10 PRIMARY HYPERTENSION: ICD-10-CM

## 2025-07-05 DIAGNOSIS — R10.13 EPIGASTRIC PAIN: Primary | ICD-10-CM

## 2025-07-05 DIAGNOSIS — E87.1 HYPONATREMIA: ICD-10-CM

## 2025-07-05 DIAGNOSIS — R07.9 CHEST PAIN: ICD-10-CM

## 2025-07-05 DIAGNOSIS — G89.29 ABDOMINAL PAIN, CHRONIC, EPIGASTRIC: ICD-10-CM

## 2025-07-05 DIAGNOSIS — E83.39 HYPOPHOSPHATEMIA: ICD-10-CM

## 2025-07-05 LAB
ABSOLUTE EOSINOPHIL (OHS): 0.31 K/UL
ABSOLUTE MONOCYTE (OHS): 0.93 K/UL (ref 0.3–1)
ABSOLUTE NEUTROPHIL COUNT (OHS): 5.22 K/UL (ref 1.8–7.7)
ALBUMIN SERPL BCP-MCNC: 3.7 G/DL (ref 3.5–5.2)
ALLENS TEST: ABNORMAL
ALP SERPL-CCNC: 52 UNIT/L (ref 40–150)
ALT SERPL W/O P-5'-P-CCNC: 18 UNIT/L (ref 10–44)
ANION GAP (OHS): 10 MMOL/L (ref 8–16)
ANION GAP (OHS): 9 MMOL/L (ref 8–16)
ANION GAP SERPL CALC-SCNC: 17 MMOL/L (ref 8–16)
AST SERPL-CCNC: 20 UNIT/L (ref 11–45)
BACTERIA #/AREA URNS AUTO: ABNORMAL /HPF
BASOPHILS # BLD AUTO: 0.05 K/UL
BASOPHILS NFR BLD AUTO: 0.6 %
BILIRUB SERPL-MCNC: 0.3 MG/DL (ref 0.1–1)
BILIRUB UR QL STRIP.AUTO: NEGATIVE
BUN SERPL-MCNC: 18 MG/DL (ref 8–23)
BUN SERPL-MCNC: 20 MG/DL (ref 6–30)
BUN SERPL-MCNC: 20 MG/DL (ref 8–23)
CALCIUM SERPL-MCNC: 8.3 MG/DL (ref 8.7–10.5)
CALCIUM SERPL-MCNC: 8.7 MG/DL (ref 8.7–10.5)
CHLORIDE SERPL-SCNC: 94 MMOL/L (ref 95–110)
CHLORIDE SERPL-SCNC: 95 MMOL/L (ref 95–110)
CHLORIDE SERPL-SCNC: 99 MMOL/L (ref 95–110)
CLARITY UR: CLEAR
CO2 SERPL-SCNC: 16 MMOL/L (ref 23–29)
CO2 SERPL-SCNC: 17 MMOL/L (ref 23–29)
COLOR UR AUTO: YELLOW
CREAT SERPL-MCNC: 1 MG/DL (ref 0.5–1.4)
CREAT SERPL-MCNC: 1.1 MG/DL (ref 0.5–1.4)
CREAT SERPL-MCNC: 1.2 MG/DL (ref 0.5–1.4)
CREAT UR-MCNC: 30.3 MG/DL (ref 15–325)
ERYTHROCYTE [DISTWIDTH] IN BLOOD BY AUTOMATED COUNT: 11.8 % (ref 11.5–14.5)
GFR SERPLBLD CREATININE-BSD FMLA CKD-EPI: 53 ML/MIN/1.73/M2
GFR SERPLBLD CREATININE-BSD FMLA CKD-EPI: 59 ML/MIN/1.73/M2
GLUCOSE SERPL-MCNC: 111 MG/DL (ref 70–110)
GLUCOSE SERPL-MCNC: 112 MG/DL (ref 70–110)
GLUCOSE SERPL-MCNC: 91 MG/DL (ref 70–110)
GLUCOSE UR QL STRIP: NEGATIVE
HCT VFR BLD AUTO: 31.6 % (ref 37–48.5)
HCT VFR BLD CALC: 32 %PCV (ref 36–54)
HGB BLD-MCNC: 10.9 GM/DL (ref 12–16)
HGB UR QL STRIP: NEGATIVE
IMM GRANULOCYTES # BLD AUTO: 0.03 K/UL (ref 0–0.04)
IMM GRANULOCYTES NFR BLD AUTO: 0.3 % (ref 0–0.5)
KETONES UR QL STRIP: NEGATIVE
LEUKOCYTE ESTERASE UR QL STRIP: ABNORMAL
LIPASE SERPL-CCNC: 222 U/L (ref 4–60)
LYMPHOCYTES # BLD AUTO: 2.04 K/UL (ref 1–4.8)
MAGNESIUM SERPL-MCNC: 1.9 MG/DL (ref 1.6–2.6)
MCH RBC QN AUTO: 30.4 PG (ref 27–31)
MCHC RBC AUTO-ENTMCNC: 34.5 G/DL (ref 32–36)
MCV RBC AUTO: 88 FL (ref 82–98)
MICROSCOPIC COMMENT: ABNORMAL
NITRITE UR QL STRIP: NEGATIVE
NON-SQ EPI CELLS #/AREA URNS AUTO: <1 /HPF
NUCLEATED RBC (/100WBC) (OHS): 0 /100 WBC
PH UR STRIP: 7 [PH]
PHOSPHATE SERPL-MCNC: 3.6 MG/DL (ref 2.7–4.5)
PLATELET # BLD AUTO: 315 K/UL (ref 150–450)
PMV BLD AUTO: 9.2 FL (ref 9.2–12.9)
POC IONIZED CALCIUM: 1.21 MMOL/L (ref 1.06–1.42)
POC TCO2 (MEASURED): 20 MMOL/L (ref 23–29)
POTASSIUM BLD-SCNC: 4.7 MMOL/L (ref 3.5–5.1)
POTASSIUM SERPL-SCNC: 4.3 MMOL/L (ref 3.5–5.1)
POTASSIUM SERPL-SCNC: 4.7 MMOL/L (ref 3.5–5.1)
PROT SERPL-MCNC: 7.5 GM/DL (ref 6–8.4)
PROT UR QL STRIP: NEGATIVE
RBC # BLD AUTO: 3.59 M/UL (ref 4–5.4)
RELATIVE EOSINOPHIL (OHS): 3.6 %
RELATIVE LYMPHOCYTE (OHS): 23.8 % (ref 18–48)
RELATIVE MONOCYTE (OHS): 10.8 % (ref 4–15)
RELATIVE NEUTROPHIL (OHS): 60.9 % (ref 38–73)
SAMPLE: ABNORMAL
SITE: ABNORMAL
SODIUM BLD-SCNC: 125 MMOL/L (ref 136–145)
SODIUM SERPL-SCNC: 121 MMOL/L (ref 136–145)
SODIUM SERPL-SCNC: 125 MMOL/L (ref 136–145)
SODIUM UR-SCNC: 30 MMOL/L (ref 20–250)
SP GR UR STRIP: 1.01
URATE SERPL-MCNC: 4.7 MG/DL (ref 2.4–5.7)
UROBILINOGEN UR STRIP-ACNC: NEGATIVE EU/DL
WBC # BLD AUTO: 8.58 K/UL (ref 3.9–12.7)
WBC #/AREA URNS AUTO: 1 /HPF (ref 0–5)

## 2025-07-05 PROCEDURE — 99285 EMERGENCY DEPT VISIT HI MDM: CPT

## 2025-07-05 PROCEDURE — 84100 ASSAY OF PHOSPHORUS: CPT | Performed by: STUDENT IN AN ORGANIZED HEALTH CARE EDUCATION/TRAINING PROGRAM

## 2025-07-05 PROCEDURE — 83690 ASSAY OF LIPASE: CPT | Performed by: STUDENT IN AN ORGANIZED HEALTH CARE EDUCATION/TRAINING PROGRAM

## 2025-07-05 PROCEDURE — 25000003 PHARM REV CODE 250: Performed by: STUDENT IN AN ORGANIZED HEALTH CARE EDUCATION/TRAINING PROGRAM

## 2025-07-05 PROCEDURE — 80053 COMPREHEN METABOLIC PANEL: CPT | Performed by: STUDENT IN AN ORGANIZED HEALTH CARE EDUCATION/TRAINING PROGRAM

## 2025-07-05 PROCEDURE — 82565 ASSAY OF CREATININE: CPT

## 2025-07-05 PROCEDURE — 63600175 PHARM REV CODE 636 W HCPCS: Performed by: STUDENT IN AN ORGANIZED HEALTH CARE EDUCATION/TRAINING PROGRAM

## 2025-07-05 PROCEDURE — 81001 URINALYSIS AUTO W/SCOPE: CPT | Performed by: STUDENT IN AN ORGANIZED HEALTH CARE EDUCATION/TRAINING PROGRAM

## 2025-07-05 PROCEDURE — 21400001 HC TELEMETRY ROOM

## 2025-07-05 PROCEDURE — 11000001 HC ACUTE MED/SURG PRIVATE ROOM

## 2025-07-05 PROCEDURE — 84300 ASSAY OF URINE SODIUM: CPT | Performed by: STUDENT IN AN ORGANIZED HEALTH CARE EDUCATION/TRAINING PROGRAM

## 2025-07-05 PROCEDURE — 83935 ASSAY OF URINE OSMOLALITY: CPT | Performed by: STUDENT IN AN ORGANIZED HEALTH CARE EDUCATION/TRAINING PROGRAM

## 2025-07-05 PROCEDURE — 36415 COLL VENOUS BLD VENIPUNCTURE: CPT | Performed by: STUDENT IN AN ORGANIZED HEALTH CARE EDUCATION/TRAINING PROGRAM

## 2025-07-05 PROCEDURE — 84132 ASSAY OF SERUM POTASSIUM: CPT

## 2025-07-05 PROCEDURE — 83735 ASSAY OF MAGNESIUM: CPT | Performed by: STUDENT IN AN ORGANIZED HEALTH CARE EDUCATION/TRAINING PROGRAM

## 2025-07-05 PROCEDURE — 84295 ASSAY OF SERUM SODIUM: CPT

## 2025-07-05 PROCEDURE — 84550 ASSAY OF BLOOD/URIC ACID: CPT | Performed by: STUDENT IN AN ORGANIZED HEALTH CARE EDUCATION/TRAINING PROGRAM

## 2025-07-05 PROCEDURE — 99900035 HC TECH TIME PER 15 MIN (STAT)

## 2025-07-05 PROCEDURE — 82570 ASSAY OF URINE CREATININE: CPT | Performed by: STUDENT IN AN ORGANIZED HEALTH CARE EDUCATION/TRAINING PROGRAM

## 2025-07-05 PROCEDURE — 85014 HEMATOCRIT: CPT

## 2025-07-05 PROCEDURE — 96374 THER/PROPH/DIAG INJ IV PUSH: CPT

## 2025-07-05 PROCEDURE — 82330 ASSAY OF CALCIUM: CPT

## 2025-07-05 PROCEDURE — 85025 COMPLETE CBC W/AUTO DIFF WBC: CPT | Performed by: STUDENT IN AN ORGANIZED HEALTH CARE EDUCATION/TRAINING PROGRAM

## 2025-07-05 PROCEDURE — 83930 ASSAY OF BLOOD OSMOLALITY: CPT | Performed by: STUDENT IN AN ORGANIZED HEALTH CARE EDUCATION/TRAINING PROGRAM

## 2025-07-05 PROCEDURE — 96361 HYDRATE IV INFUSION ADD-ON: CPT

## 2025-07-05 RX ORDER — HYDROCODONE BITARTRATE AND ACETAMINOPHEN 7.5; 325 MG/1; MG/1
1 TABLET ORAL EVERY 6 HOURS PRN
Refills: 0 | Status: DISCONTINUED | OUTPATIENT
Start: 2025-07-05 | End: 2025-07-08 | Stop reason: HOSPADM

## 2025-07-05 RX ORDER — ENOXAPARIN SODIUM 100 MG/ML
40 INJECTION SUBCUTANEOUS EVERY 24 HOURS
Status: DISCONTINUED | OUTPATIENT
Start: 2025-07-05 | End: 2025-07-08 | Stop reason: HOSPADM

## 2025-07-05 RX ORDER — LIDOCAINE HYDROCHLORIDE 20 MG/ML
15 SOLUTION OROPHARYNGEAL
Status: COMPLETED | OUTPATIENT
Start: 2025-07-05 | End: 2025-07-05

## 2025-07-05 RX ORDER — IBUPROFEN 200 MG
16 TABLET ORAL
Status: DISCONTINUED | OUTPATIENT
Start: 2025-07-05 | End: 2025-07-08 | Stop reason: HOSPADM

## 2025-07-05 RX ORDER — PROCHLORPERAZINE EDISYLATE 5 MG/ML
5 INJECTION INTRAMUSCULAR; INTRAVENOUS EVERY 6 HOURS PRN
Status: DISCONTINUED | OUTPATIENT
Start: 2025-07-05 | End: 2025-07-08 | Stop reason: HOSPADM

## 2025-07-05 RX ORDER — BISACODYL 10 MG/1
10 SUPPOSITORY RECTAL DAILY PRN
Status: DISCONTINUED | OUTPATIENT
Start: 2025-07-05 | End: 2025-07-08 | Stop reason: HOSPADM

## 2025-07-05 RX ORDER — SODIUM,POTASSIUM PHOSPHATES 280-250MG
2 POWDER IN PACKET (EA) ORAL
Status: DISCONTINUED | OUTPATIENT
Start: 2025-07-05 | End: 2025-07-08 | Stop reason: HOSPADM

## 2025-07-05 RX ORDER — NALOXONE HCL 0.4 MG/ML
0.02 VIAL (ML) INJECTION
Status: DISCONTINUED | OUTPATIENT
Start: 2025-07-05 | End: 2025-07-08 | Stop reason: HOSPADM

## 2025-07-05 RX ORDER — ONDANSETRON HYDROCHLORIDE 2 MG/ML
4 INJECTION, SOLUTION INTRAVENOUS EVERY 8 HOURS PRN
Status: DISCONTINUED | OUTPATIENT
Start: 2025-07-05 | End: 2025-07-08 | Stop reason: HOSPADM

## 2025-07-05 RX ORDER — AMLODIPINE BESYLATE 5 MG/1
5 TABLET ORAL DAILY
Status: DISCONTINUED | OUTPATIENT
Start: 2025-07-05 | End: 2025-07-08 | Stop reason: HOSPADM

## 2025-07-05 RX ORDER — AMOXICILLIN 250 MG
1 CAPSULE ORAL DAILY PRN
Status: DISCONTINUED | OUTPATIENT
Start: 2025-07-05 | End: 2025-07-08 | Stop reason: HOSPADM

## 2025-07-05 RX ORDER — ONDANSETRON HYDROCHLORIDE 2 MG/ML
4 INJECTION, SOLUTION INTRAVENOUS
Status: COMPLETED | OUTPATIENT
Start: 2025-07-05 | End: 2025-07-05

## 2025-07-05 RX ORDER — SODIUM CHLORIDE 0.9 % (FLUSH) 0.9 %
10 SYRINGE (ML) INJECTION EVERY 12 HOURS PRN
Status: DISCONTINUED | OUTPATIENT
Start: 2025-07-05 | End: 2025-07-08 | Stop reason: HOSPADM

## 2025-07-05 RX ORDER — LANOLIN ALCOHOL/MO/W.PET/CERES
800 CREAM (GRAM) TOPICAL
Status: DISCONTINUED | OUTPATIENT
Start: 2025-07-05 | End: 2025-07-08 | Stop reason: HOSPADM

## 2025-07-05 RX ORDER — ALUMINUM HYDROXIDE, MAGNESIUM HYDROXIDE, AND SIMETHICONE 1200; 120; 1200 MG/30ML; MG/30ML; MG/30ML
30 SUSPENSION ORAL
Status: COMPLETED | OUTPATIENT
Start: 2025-07-05 | End: 2025-07-05

## 2025-07-05 RX ORDER — GLUCAGON 1 MG
1 KIT INJECTION
Status: DISCONTINUED | OUTPATIENT
Start: 2025-07-05 | End: 2025-07-08 | Stop reason: HOSPADM

## 2025-07-05 RX ORDER — PANTOPRAZOLE SODIUM 40 MG/1
40 TABLET, DELAYED RELEASE ORAL DAILY
Status: DISCONTINUED | OUTPATIENT
Start: 2025-07-06 | End: 2025-07-08 | Stop reason: HOSPADM

## 2025-07-05 RX ORDER — HYDROCODONE BITARTRATE AND ACETAMINOPHEN 5; 325 MG/1; MG/1
1 TABLET ORAL EVERY 6 HOURS PRN
Refills: 0 | Status: DISCONTINUED | OUTPATIENT
Start: 2025-07-05 | End: 2025-07-08 | Stop reason: HOSPADM

## 2025-07-05 RX ORDER — TALC
6 POWDER (GRAM) TOPICAL NIGHTLY PRN
Status: DISCONTINUED | OUTPATIENT
Start: 2025-07-05 | End: 2025-07-08 | Stop reason: HOSPADM

## 2025-07-05 RX ORDER — ACETAMINOPHEN 325 MG/1
650 TABLET ORAL EVERY 4 HOURS PRN
Status: DISCONTINUED | OUTPATIENT
Start: 2025-07-05 | End: 2025-07-08 | Stop reason: HOSPADM

## 2025-07-05 RX ORDER — LABETALOL HYDROCHLORIDE 5 MG/ML
10 INJECTION, SOLUTION INTRAVENOUS EVERY 6 HOURS PRN
Status: DISCONTINUED | OUTPATIENT
Start: 2025-07-05 | End: 2025-07-08 | Stop reason: HOSPADM

## 2025-07-05 RX ORDER — ALUMINUM HYDROXIDE, MAGNESIUM HYDROXIDE, AND SIMETHICONE 1200; 120; 1200 MG/30ML; MG/30ML; MG/30ML
30 SUSPENSION ORAL 4 TIMES DAILY PRN
Status: DISCONTINUED | OUTPATIENT
Start: 2025-07-05 | End: 2025-07-08 | Stop reason: HOSPADM

## 2025-07-05 RX ORDER — MORPHINE SULFATE 4 MG/ML
4 INJECTION, SOLUTION INTRAMUSCULAR; INTRAVENOUS EVERY 6 HOURS PRN
Status: DISCONTINUED | OUTPATIENT
Start: 2025-07-05 | End: 2025-07-08 | Stop reason: HOSPADM

## 2025-07-05 RX ORDER — ACETAMINOPHEN 325 MG/1
650 TABLET ORAL EVERY 8 HOURS PRN
Status: DISCONTINUED | OUTPATIENT
Start: 2025-07-05 | End: 2025-07-08 | Stop reason: HOSPADM

## 2025-07-05 RX ORDER — IBUPROFEN 200 MG
24 TABLET ORAL
Status: DISCONTINUED | OUTPATIENT
Start: 2025-07-05 | End: 2025-07-08 | Stop reason: HOSPADM

## 2025-07-05 RX ORDER — HYDRALAZINE HYDROCHLORIDE 20 MG/ML
10 INJECTION INTRAMUSCULAR; INTRAVENOUS EVERY 6 HOURS PRN
Status: DISCONTINUED | OUTPATIENT
Start: 2025-07-05 | End: 2025-07-08 | Stop reason: HOSPADM

## 2025-07-05 RX ORDER — VALSARTAN 80 MG/1
320 TABLET ORAL DAILY
Status: DISCONTINUED | OUTPATIENT
Start: 2025-07-05 | End: 2025-07-08 | Stop reason: HOSPADM

## 2025-07-05 RX ORDER — SODIUM CHLORIDE 9 MG/ML
INJECTION, SOLUTION INTRAVENOUS CONTINUOUS
Status: DISCONTINUED | OUTPATIENT
Start: 2025-07-05 | End: 2025-07-06

## 2025-07-05 RX ORDER — SIMETHICONE 80 MG
1 TABLET,CHEWABLE ORAL 4 TIMES DAILY PRN
Status: DISCONTINUED | OUTPATIENT
Start: 2025-07-05 | End: 2025-07-08 | Stop reason: HOSPADM

## 2025-07-05 RX ADMIN — SODIUM CHLORIDE 1000 ML: 9 INJECTION, SOLUTION INTRAVENOUS at 06:07

## 2025-07-05 RX ADMIN — MORPHINE SULFATE 4 MG: 4 INJECTION, SOLUTION INTRAMUSCULAR; INTRAVENOUS at 08:07

## 2025-07-05 RX ADMIN — SODIUM CHLORIDE: 9 INJECTION, SOLUTION INTRAVENOUS at 10:07

## 2025-07-05 RX ADMIN — VALSARTAN 320 MG: 80 TABLET, FILM COATED ORAL at 08:07

## 2025-07-05 RX ADMIN — ALUMINUM HYDROXIDE, MAGNESIUM HYDROXIDE, AND SIMETHICONE 30 ML: 200; 200; 20 SUSPENSION ORAL at 05:07

## 2025-07-05 RX ADMIN — AMLODIPINE BESYLATE 5 MG: 5 TABLET ORAL at 08:07

## 2025-07-05 RX ADMIN — ENOXAPARIN SODIUM 40 MG: 40 INJECTION SUBCUTANEOUS at 08:07

## 2025-07-05 RX ADMIN — ONDANSETRON 4 MG: 2 INJECTION INTRAMUSCULAR; INTRAVENOUS at 05:07

## 2025-07-05 RX ADMIN — LIDOCAINE HYDROCHLORIDE 15 ML: 20 SOLUTION ORAL at 05:07

## 2025-07-05 RX ADMIN — ALUMINUM HYDROXIDE, MAGNESIUM HYDROXIDE, AND SIMETHICONE 30 ML: 200; 200; 20 SUSPENSION ORAL at 11:07

## 2025-07-05 RX ADMIN — SODIUM CHLORIDE 500 ML: 9 INJECTION, SOLUTION INTRAVENOUS at 08:07

## 2025-07-05 NOTE — ED PROVIDER NOTES
Encounter Date: 7/5/2025       History     Chief Complaint   Patient presents with    Abdominal Pain     Pt reports abd pain and generalized weakness x1 week. Pt reports being seen here 10 days ago, prescribed Malox, pepcid and zofran which she has been taking without relief. Pt reports she had a referral to see GI but she called the number on her AVS and they will not answer the phone.      75-year-old female with PMH of hypertension presents with abdominal pain.  Patient states that she has had this abdominal pain for a long time but it has been worse over the past couple of weeks.  States it is 10/10, in the epigastrium, constant, without specific aggravating relieving factors.  No medications she has taken have improved her symptoms.  Reports decreased appetite but denies nausea, vomiting, dysuria, hematuria, and constipation.  Intermittent diarrhea that improved during the last hospital stay.  Denies chest pain, shortness of breath, headache, dizziness.  Reports not being fully compliant with her blood pressure medications.  She has not had any change to the type of abdominal pain over the past few months but only that it is becoming more severe. No recent international travel.     Per patient's daughter who is bedside:  She has had to bring the patient to the hospital 3 times in the last week and a half.  They wanted to do a scope but the patient ate, so they were not able to.    A  was used.     Review of patient's allergies indicates:  No Known Allergies  Past Medical History:   Diagnosis Date    Arthritis     Hypertension      History reviewed. No pertinent surgical history.  No family history on file.  Social History[1]  Review of Systems    Physical Exam     Initial Vitals [07/05/25 1609]   BP Pulse Resp Temp SpO2   (!) 208/109 76 18 97.8 °F (36.6 °C) 99 %      MAP       --         Physical Exam    Vitals reviewed.  Constitutional: Vital signs are normal. She appears well-developed and  well-nourished. No distress.   HENT:   Head: Normocephalic and atraumatic.   Eyes: Conjunctivae are normal.   Cardiovascular:  Normal rate, regular rhythm and intact distal pulses.           Pulmonary/Chest: No respiratory distress.   Abdominal:   Soft, non-distended abdomen with tenderness to palpation epigastrically. No rebound or guarding   Musculoskeletal:         General: No edema.     Neurological: She is alert and oriented to person, place, and time. GCS eye subscore is 4. GCS verbal subscore is 5. GCS motor subscore is 6.   Skin: Skin is warm.         ED Course   Procedures  Labs Reviewed   COMPREHENSIVE METABOLIC PANEL - Abnormal       Result Value    Sodium 121 (*)     Potassium 4.7      Chloride 95      CO2 17 (*)     Glucose 112 (*)     BUN 20      Creatinine 1.1      Calcium 8.7      Protein Total 7.5      Albumin 3.7      Bilirubin Total 0.3      ALP 52      AST 20      ALT 18      Anion Gap 9      eGFR 53 (*)    LIPASE - Abnormal    Lipase Level 222 (*)    CBC WITH DIFFERENTIAL - Abnormal    WBC 8.58      RBC 3.59 (*)     HGB 10.9 (*)     HCT 31.6 (*)     MCV 88      MCH 30.4      MCHC 34.5      RDW 11.8      Platelet Count 315      MPV 9.2      Nucleated RBC 0      Neut % 60.9      Lymph % 23.8      Mono % 10.8      Eos % 3.6      Basophil % 0.6      Imm Grans % 0.3      Neut # 5.22      Lymph # 2.04      Mono # 0.93      Eos # 0.31      Baso # 0.05      Imm Grans # 0.03     ISTAT PROCEDURE - Abnormal    POC Glucose 111 (*)     POC BUN 20      POC Creatinine 1.2      POC Sodium 125 (*)     POC Potassium 4.7      POC Chloride 94 (*)     POC TCO2 (MEASURED) 20 (*)     POC Anion Gap 17 (*)     POC Ionized Calcium 1.21      POC Hematocrit 32 (*)     Sample VENOUS      Site Other      Allens Test N/A     MAGNESIUM - Normal    Magnesium  1.9     URIC ACID - Normal    Uric Acid 4.7     PHOSPHORUS - Normal    Phosphorus Level 3.6     CBC W/ AUTO DIFFERENTIAL    Narrative:     The following orders were  created for panel order CBC W/ AUTO DIFFERENTIAL.  Procedure                               Abnormality         Status                     ---------                               -----------         ------                     CBC with Differential[[id number]]       Abnormal            Final result                 Please view results for these tests on the individual orders.   URINALYSIS, REFLEX TO URINE CULTURE   OSMOLALITY, SERUM   SODIUM, URINE, RANDOM   OSMOLALITY, URINE RANDOM   CREATININE, URINE, RANDOM   ISTAT CHEM8          Imaging Results    None          Medications   sodium chloride 0.9% bolus 500 mL 500 mL (has no administration in time range)   valsartan tablet 320 mg (has no administration in time range)   sodium chloride 0.9% flush 10 mL (has no administration in time range)   melatonin tablet 6 mg (has no administration in time range)   ondansetron injection 4 mg (has no administration in time range)   prochlorperazine injection Soln 5 mg (has no administration in time range)   senna-docusate 8.6-50 mg per tablet 1 tablet (has no administration in time range)   bisacodyL suppository 10 mg (has no administration in time range)   acetaminophen tablet 650 mg (has no administration in time range)   simethicone chewable tablet 80 mg (has no administration in time range)   aluminum-magnesium hydroxide-simethicone 200-200-20 mg/5 mL suspension 30 mL (has no administration in time range)   acetaminophen tablet 650 mg (has no administration in time range)   naloxone 0.4 mg/mL injection 0.02 mg (has no administration in time range)   potassium bicarbonate disintegrating tablet 50 mEq (has no administration in time range)   potassium bicarbonate disintegrating tablet 35 mEq (has no administration in time range)   potassium bicarbonate disintegrating tablet 60 mEq (has no administration in time range)   magnesium oxide tablet 800 mg (has no administration in time range)   magnesium oxide tablet 800 mg (has no  administration in time range)   potassium, sodium phosphates 280-160-250 mg packet 2 packet (has no administration in time range)   potassium, sodium phosphates 280-160-250 mg packet 2 packet (has no administration in time range)   potassium, sodium phosphates 280-160-250 mg packet 2 packet (has no administration in time range)   glucose chewable tablet 16 g (has no administration in time range)   glucose chewable tablet 24 g (has no administration in time range)   glucagon (human recombinant) injection 1 mg (has no administration in time range)   enoxaparin injection 40 mg (has no administration in time range)   ondansetron injection 4 mg (4 mg Intravenous Given 7/5/25 1751)   aluminum-magnesium hydroxide-simethicone 200-200-20 mg/5 mL suspension 30 mL (30 mLs Oral Given 7/5/25 1751)     And   LIDOcaine viscous HCl 2% oral solution 15 mL (15 mLs Oral Given 7/5/25 1751)     Medical Decision Making  Differential diagnoses considered includes gastritis/PUD, hyponatremia, electrolyte abnormality, BRENDA, colitis, pancreatitis, gastroparesis, doubt obstruction    All the patient's symptoms are chronic without any change in the character/quality other than slow progression of severity. Will treat initially with GI cocktail and zofran. I considered CT but did not obtain, since patient had one recently and there haven't been major changes. See ED course for additional attending MDM.    Amount and/or Complexity of Data Reviewed  Labs: ordered. Decision-making details documented in ED Course.    Risk  OTC drugs.  Prescription drug management.  Decision regarding hospitalization.  Diagnosis or treatment significantly limited by social determinants of health.               ED Course as of 07/05/25 1905   Sat Jul 05, 2025 1903 Comp. Metabolic Panel(!)  Hyponatremia of 121.  Patient has a generalized fatigue but otherwise is asymptomatic. Am giving 500cc NS [BD]   1904 Lipase(!): 222  Consistent with pancreatitis [BD]   1905  Discussed with  who admitted the patient for hyponatremia.     Procedure: Critical Care  Please put in 30 minutes of critical care.   Critical care was necessary to treat or prevent imminent or life-threatening deterioration of the following conditions:  hyponatremia       [BD]      ED Course User Index  [BD] Shawn Pereira MD                           Clinical Impression:  Final diagnoses:  [R10.13, G89.29] Abdominal pain, chronic, epigastric (Primary)  [E87.1] Hyponatremia  [K85.90] Acute pancreatitis, unspecified complication status, unspecified pancreatitis type          ED Disposition Condition    Admit                       [1]   Social History  Tobacco Use    Smoking status: Never    Smokeless tobacco: Never   Substance Use Topics    Alcohol use: Never    Drug use: Never        Shawn Pereira MD  07/05/25 9899

## 2025-07-06 PROBLEM — K52.9 ENTERITIS: Status: ACTIVE | Noted: 2025-07-06

## 2025-07-06 LAB
ABSOLUTE EOSINOPHIL (OHS): 0.33 K/UL
ABSOLUTE MONOCYTE (OHS): 0.97 K/UL (ref 0.3–1)
ABSOLUTE NEUTROPHIL COUNT (OHS): 3.86 K/UL (ref 1.8–7.7)
ANION GAP (OHS): 10 MMOL/L (ref 8–16)
ANION GAP (OHS): 10 MMOL/L (ref 8–16)
ANION GAP (OHS): 11 MMOL/L (ref 8–16)
ANION GAP (OHS): 11 MMOL/L (ref 8–16)
BASOPHILS # BLD AUTO: 0.07 K/UL
BASOPHILS NFR BLD AUTO: 0.9 %
BUN SERPL-MCNC: 12 MG/DL (ref 8–23)
BUN SERPL-MCNC: 12 MG/DL (ref 8–23)
BUN SERPL-MCNC: 14 MG/DL (ref 8–23)
BUN SERPL-MCNC: 16 MG/DL (ref 8–23)
CALCIUM SERPL-MCNC: 8.3 MG/DL (ref 8.7–10.5)
CALCIUM SERPL-MCNC: 8.3 MG/DL (ref 8.7–10.5)
CALCIUM SERPL-MCNC: 8.5 MG/DL (ref 8.7–10.5)
CALCIUM SERPL-MCNC: 9.1 MG/DL (ref 8.7–10.5)
CHLORIDE SERPL-SCNC: 102 MMOL/L (ref 95–110)
CHLORIDE SERPL-SCNC: 105 MMOL/L (ref 95–110)
CHLORIDE SERPL-SCNC: 105 MMOL/L (ref 95–110)
CHLORIDE SERPL-SCNC: 107 MMOL/L (ref 95–110)
CO2 SERPL-SCNC: 16 MMOL/L (ref 23–29)
CO2 SERPL-SCNC: 16 MMOL/L (ref 23–29)
CO2 SERPL-SCNC: 17 MMOL/L (ref 23–29)
CO2 SERPL-SCNC: 18 MMOL/L (ref 23–29)
CREAT SERPL-MCNC: 0.8 MG/DL (ref 0.5–1.4)
CREAT SERPL-MCNC: 0.9 MG/DL (ref 0.5–1.4)
ERYTHROCYTE [DISTWIDTH] IN BLOOD BY AUTOMATED COUNT: 11.9 % (ref 11.5–14.5)
GFR SERPLBLD CREATININE-BSD FMLA CKD-EPI: >60 ML/MIN/1.73/M2
GLUCOSE SERPL-MCNC: 102 MG/DL (ref 70–110)
GLUCOSE SERPL-MCNC: 106 MG/DL (ref 70–110)
GLUCOSE SERPL-MCNC: 120 MG/DL (ref 70–110)
GLUCOSE SERPL-MCNC: 96 MG/DL (ref 70–110)
HCT VFR BLD AUTO: 33.1 % (ref 37–48.5)
HGB BLD-MCNC: 10.9 GM/DL (ref 12–16)
HOLD SPECIMEN: NORMAL
IMM GRANULOCYTES # BLD AUTO: 0.05 K/UL (ref 0–0.04)
IMM GRANULOCYTES NFR BLD AUTO: 0.7 % (ref 0–0.5)
LYMPHOCYTES # BLD AUTO: 2.41 K/UL (ref 1–4.8)
MAGNESIUM SERPL-MCNC: 2 MG/DL (ref 1.6–2.6)
MCH RBC QN AUTO: 30.6 PG (ref 27–31)
MCHC RBC AUTO-ENTMCNC: 32.9 G/DL (ref 32–36)
MCV RBC AUTO: 93 FL (ref 82–98)
NUCLEATED RBC (/100WBC) (OHS): 0 /100 WBC
OSMOLALITY SERPL: 263 MOSM/KG (ref 275–295)
OSMOLALITY UR: 193 MOSM/KG (ref 50–1200)
PHOSPHATE SERPL-MCNC: 3 MG/DL (ref 2.7–4.5)
PLATELET # BLD AUTO: ABNORMAL 10*3/UL
PMV BLD AUTO: ABNORMAL FL
POTASSIUM SERPL-SCNC: 4.1 MMOL/L (ref 3.5–5.1)
POTASSIUM SERPL-SCNC: 4.2 MMOL/L (ref 3.5–5.1)
RBC # BLD AUTO: 3.56 M/UL (ref 4–5.4)
RELATIVE EOSINOPHIL (OHS): 4.3 %
RELATIVE LYMPHOCYTE (OHS): 31.3 % (ref 18–48)
RELATIVE MONOCYTE (OHS): 12.6 % (ref 4–15)
RELATIVE NEUTROPHIL (OHS): 50.2 % (ref 38–73)
SODIUM SERPL-SCNC: 129 MMOL/L (ref 136–145)
SODIUM SERPL-SCNC: 133 MMOL/L (ref 136–145)
WBC # BLD AUTO: 7.69 K/UL (ref 3.9–12.7)

## 2025-07-06 PROCEDURE — 83735 ASSAY OF MAGNESIUM: CPT | Performed by: STUDENT IN AN ORGANIZED HEALTH CARE EDUCATION/TRAINING PROGRAM

## 2025-07-06 PROCEDURE — 84100 ASSAY OF PHOSPHORUS: CPT | Performed by: STUDENT IN AN ORGANIZED HEALTH CARE EDUCATION/TRAINING PROGRAM

## 2025-07-06 PROCEDURE — 63600175 PHARM REV CODE 636 W HCPCS: Performed by: STUDENT IN AN ORGANIZED HEALTH CARE EDUCATION/TRAINING PROGRAM

## 2025-07-06 PROCEDURE — 85025 COMPLETE CBC W/AUTO DIFF WBC: CPT | Performed by: STUDENT IN AN ORGANIZED HEALTH CARE EDUCATION/TRAINING PROGRAM

## 2025-07-06 PROCEDURE — 36415 COLL VENOUS BLD VENIPUNCTURE: CPT | Performed by: STUDENT IN AN ORGANIZED HEALTH CARE EDUCATION/TRAINING PROGRAM

## 2025-07-06 PROCEDURE — 25000003 PHARM REV CODE 250: Performed by: STUDENT IN AN ORGANIZED HEALTH CARE EDUCATION/TRAINING PROGRAM

## 2025-07-06 PROCEDURE — 21400001 HC TELEMETRY ROOM

## 2025-07-06 PROCEDURE — 80048 BASIC METABOLIC PNL TOTAL CA: CPT | Performed by: STUDENT IN AN ORGANIZED HEALTH CARE EDUCATION/TRAINING PROGRAM

## 2025-07-06 RX ORDER — DEXTROSE MONOHYDRATE 50 MG/ML
INJECTION, SOLUTION INTRAVENOUS CONTINUOUS
Status: ACTIVE | OUTPATIENT
Start: 2025-07-06 | End: 2025-07-06

## 2025-07-06 RX ORDER — DEXTROSE MONOHYDRATE 50 MG/ML
INJECTION, SOLUTION INTRAVENOUS CONTINUOUS
Status: DISCONTINUED | OUTPATIENT
Start: 2025-07-06 | End: 2025-07-06

## 2025-07-06 RX ADMIN — VALSARTAN 320 MG: 80 TABLET, FILM COATED ORAL at 09:07

## 2025-07-06 RX ADMIN — DEXTROSE MONOHYDRATE: 50 INJECTION, SOLUTION INTRAVENOUS at 06:07

## 2025-07-06 RX ADMIN — ENOXAPARIN SODIUM 40 MG: 40 INJECTION SUBCUTANEOUS at 04:07

## 2025-07-06 RX ADMIN — AMLODIPINE BESYLATE 5 MG: 5 TABLET ORAL at 09:07

## 2025-07-06 RX ADMIN — DEXTROSE MONOHYDRATE: 50 INJECTION, SOLUTION INTRAVENOUS at 11:07

## 2025-07-06 RX ADMIN — DEXTROSE MONOHYDRATE: 50 INJECTION, SOLUTION INTRAVENOUS at 01:07

## 2025-07-06 RX ADMIN — ALUMINUM HYDROXIDE, MAGNESIUM HYDROXIDE, AND SIMETHICONE 30 ML: 200; 200; 20 SUSPENSION ORAL at 10:07

## 2025-07-06 RX ADMIN — ACETAMINOPHEN 650 MG: 325 TABLET ORAL at 11:07

## 2025-07-06 RX ADMIN — PANTOPRAZOLE SODIUM 40 MG: 40 TABLET, DELAYED RELEASE ORAL at 09:07

## 2025-07-06 NOTE — PROGRESS NOTES
[facility] Medicine  Progress Note    Patient Name: Monique Merchant  MRN: [id number]  Patient Class: IP- Inpatient   Admission Date: 7/5/2025  Length of Stay: 1 days  Attending Physician: Betina Jackson MD  Primary Care Provider: Unable, To Obtain        Subjective     Principal Problem:Enteritis        HPI:  This is a 75-year-old female with a past medical history of hyperlipidemia, GERD, anxiety, who presents with abdominal pain.    Patient presents for evaluation of abdominal pain that started about 5 days prior to presentation.  She reports epigastric abdominal pain without radiation, associated with chills and diarrhea.  Her last bowel movement was on the morning of presentation.  Additionally, she reports that her blood pressure has been elevated. Of note, the patient had a recent hospitalization (6/28-6/29) for abdominal pain/headaches.  CT abdomen and pelvis (6/25) showed no acute process.  She was discharged to follow with Neurology, and noted to have a sodium of 133 on 6/29.    In the ED, the patient was hypertensive (up to 226/89, MAP: 128).  Labs were remarkable for hyponatremia (121), normocytic anemia (10.9), elevated lipase (222).  Patient was given 500 mL of NS, Zofran 4 mg IV, viscous lidocaine, Maalox.  She was admitted for further management.    Overview/Hospital Course:  No notes on file    Interval History:   7/6   No acute events overnight.  Patient reports that her abdominal pain has almost resolved.  No bowel movements since yesterday.    She reports that she [place] and vaguely remembers being told that she had ulcers with bleeding.  She was started on some medications and recovered.    Currently feels better but not feeling entirely comfortable to go home.      Review of Systems   Constitutional:  Positive for appetite change (Poor appetite due to nausea, vomiting). Negative for activity change.   HENT:  Negative.     Eyes: Negative.    Respiratory:  Negative for chest tightness and shortness of breath.    Cardiovascular:  Negative for chest pain.   Gastrointestinal:  Positive for abdominal pain (Improved). Negative for diarrhea, nausea and vomiting.   Endocrine: Negative.    Genitourinary:  Positive for frequency.   Musculoskeletal: Negative.      Objective:     Vital Signs (Most Recent):  Temp: 98.5 °F (36.9 °C) (07/06/25 1113)  Pulse: 68 (07/06/25 1113)  Resp: 18 (07/06/25 1113)  BP: 111/69 (07/06/25 1113)  SpO2: 98 % (07/06/25 1113) Vital Signs (24h Range):  Temp:  [97.8 °F (36.6 °C)-98.6 °F (37 °C)] 98.5 °F (36.9 °C)  Pulse:  [61-92] 68  Resp:  [15-20] 18  SpO2:  [97 %-100 %] 98 %  BP: (111-226)/() 111/69     Weight: 67 kg (147 lb 11.2 oz)  Body mass index is 27.91 kg/m².    Intake/Output Summary (Last 24 hours) at 7/6/2025 1253  Last data filed at 7/6/2025 0700  Gross per 24 hour   Intake 1538.09 ml   Output --   Net 1538.09 ml         Physical Exam  Vitals and nursing note reviewed.   Constitutional:       General: She is not in acute distress.     Appearance: Normal appearance.   HENT:      Head: Normocephalic and atraumatic.      Nose: Nose normal.   Eyes:      Pupils: Pupils are equal, round, and reactive to light.   Cardiovascular:      Rate and Rhythm: Normal rate and regular rhythm.      Pulses: Normal pulses.      Heart sounds: Normal heart sounds.   Pulmonary:      Effort: Pulmonary effort is normal. No respiratory distress.      Breath sounds: Normal breath sounds.   Abdominal:      General: There is no distension.      Palpations: Abdomen is soft.      Tenderness: There is no abdominal tenderness.   Musculoskeletal:         General: No swelling. Normal range of motion.      Cervical back: Normal range of motion and neck supple.   Skin:     General: Skin is warm.      Capillary Refill: Capillary refill takes less than 2 seconds.      Coloration: Skin is not jaundiced.   Neurological:       General: No focal deficit present.      Mental Status: She is alert and oriented to person, place, and time.   Psychiatric:         Mood and Affect: Mood normal.               Significant Labs: All pertinent labs within the past 24 hours have been reviewed.  CBC:   Recent Labs   Lab 07/05/25 1753 07/05/25  1805 07/06/25  0440   WBC 8.58  --  7.69   HGB 10.9*  --  10.9*   HCT 31.6* 32* 33.1*     --   --      CMP:   Recent Labs   Lab 07/05/25 1753 07/05/25  2103 07/06/25  0440 07/06/25  0824 07/06/25  1148   *   < > 133* 133* 133*   K 4.7   < > 4.2 4.2 4.2   CL 95   < > 107 105 105   CO2 17*   < > 16* 18* 17*   *   < > 102 120* 106   BUN 20   < > 14 12 12   CREATININE 1.1   < > 0.8 0.9 0.9   CALCIUM 8.7   < > 8.3* 8.5* 9.1   PROT 7.5  --   --   --   --    ALBUMIN 3.7  --   --   --   --    BILITOT 0.3  --   --   --   --    ALKPHOS 52  --   --   --   --    AST 20  --   --   --   --    ALT 18  --   --   --   --    ANIONGAP 9   < > 10 10 11    < > = values in this interval not displayed.       Significant Imaging: I have reviewed all pertinent imaging results/findings within the past 24 hours.      Assessment & Plan  Hyponatremia  Hyponatremia is likely due to Dehydration/hypovolemia. The patient's most recent sodium results are listed below.  Recent Labs     07/06/25 0440 07/06/25  0824 07/06/25  1148   * 133* 133*     Plan  - Correct the sodium by 4-6mEq in 24 hours.   - Obtain the following studies: Urine sodium, urine osmolality, serum osmolality.  - Will treat the hyponatremia with IV fluids as follows: 100 ml/hr, if no overcorrection   - Monitor sodium Every 4 hours.   - Patient hyponatremia is stable    Primary hypertension  Patient's blood pressure range in the last 24 hours was: BP  Min: 111/69  Max: 226/89.The patient's inpatient anti-hypertensive regimen is listed below:  Current Antihypertensives  valsartan tablet 320 mg, Daily, Oral  amLODIPine tablet 5 mg, Daily, Oral  labetaloL  injection 10 mg, Every 6 hours PRN, Intravenous  hydrALAZINE injection 10 mg, Every 6 hours PRN, Intravenous    Plan  - BP is controlled, no changes needed to their regimen    GERD (gastroesophageal reflux disease)  Continue PPI   Outpatient follow-up with GI    Epigastric pain  -Likely in the setting of gastritis/PUD.  Could be in the setting of acute pancreatitis given elevated lipase.  -CT abdomen and pelvis (6/25) showed no acute process  -Ordered H pylori antibodies.  -Continue IV ppi.        Gastroenteritis  -Presented with 5 day history of abdominal pain, nausea, vomiting, diarrhea.  -Reported similar episode last week where she was admitted for 1 day and was discharged home.  -CT abdomen and pelvis done at that time was negative for acute process.    -Given recurrent episode, we will repeat CT abdomen/pelvis.  -Continue IV ppi.    -Continue maintenance fluids.  -Advance diet as tolerated.  VTE Risk Mitigation (From admission, onward)           Ordered     enoxaparin injection 40 mg  Daily         07/05/25 1901     IP VTE HIGH RISK PATIENT  Once         07/05/25 1900     Place sequential compression device  Until discontinued         07/05/25 1900                    Discharge Planning   SAVANNA:   07/07/2025    Code Status: Full Code   Medical Readiness for Discharge Date:   Discharge Plan A: Home with family            Betina Jackson MD  Department of Hospital Medicine   [facility]

## 2025-07-06 NOTE — PLAN OF CARE
Case Management Assessment - [facility]    Interpretor Geovanny #[id number]    PCP: Otis Justin MD   Pharmacy:   [id number] - [place] - [address] AT [facility] [facility]  [address]  [place] [address]  Phone: [phone] Fax: [phone]       Patient Arrived From: Home  Existing Help at Home: Family    Barriers to Discharge: None    Discharge Plan:    A. Home   B. Home with family    CM Initial Assessment completed. Interpretor will be needed. Pt is independent and has support from her daughters. No DME or Outpatient services. Family will provide transportation at d/c. CM will continue to monitor. Will provide transportation number with [facility] [phone]   07/06/25 1113   Discharge Assessment   Assessment Type Discharge Planning Assessment   Confirmed/corrected address, phone number and insurance Yes   Confirmed Demographics Correct on Facesheet   Source of Information patient;family   People in Home child(tucker), dependent   Name(s) of People in Home Maribel, daughter   Facility Arrived From: Home   Do you expect to return to your current living situation? Yes   Do you have help at home or someone to help you manage your care at home? Yes   Prior to hospitilization cognitive status: No Deficits;Alert/Oriented   Current cognitive status: Alert/Oriented;No Deficits   Walking or Climbing Stairs Difficulty no   Dressing/Bathing Difficulty no   Equipment Currently Used at Home none   Readmission within 30 days? No   Patient currently being followed by outpatient case management? No   Do you currently have service(s) that help you manage your care at home? No   Do you take prescription medications? Yes   Do you have prescription coverage? Yes   Do you have any problems affording any of your prescribed medications? No   Is the patient taking medications as prescribed? yes   Who is going to help you get home at discharge? [facility]   How do you get to doctors appointments? family or  friend will provide   Are you on dialysis? No   Do you take coumadin? No   Discharge Plan A Home with family   Discharge Plan B Home with family   DME Needed Upon Discharge  none   Discharge Plan discussed with: Adult children;Patient   Transition of Care Barriers None

## 2025-07-06 NOTE — ASSESSMENT & PLAN NOTE
-Likely in the setting of gastritis/PUD.  Could be in the setting of acute pancreatitis given elevated lipase.  -CT abdomen and pelvis (6/25) showed no acute process  -Ordered H pylori antibodies.  -Continue IV ppi.

## 2025-07-06 NOTE — HPI
This is a 75-year-old female with a past medical history of hyperlipidemia, GERD, anxiety, who presents with abdominal pain.    Patient presents for evaluation of abdominal pain that started about 5 days prior to presentation.  She reports epigastric abdominal pain without radiation, associated with chills and diarrhea.  Her last bowel movement was on the morning of presentation.  Additionally, she reports that her blood pressure has been elevated. Of note, the patient had a recent hospitalization (6/28-6/29) for abdominal pain/headaches.  CT abdomen and pelvis (6/25) showed no acute process.  She was discharged to follow with Neurology, and noted to have a sodium of 133 on 6/29.    In the ED, the patient was hypertensive (up to 226/89, MAP: 128).  Labs were remarkable for hyponatremia (121), normocytic anemia (10.9), elevated lipase (222).  Patient was given 500 mL of NS, Zofran 4 mg IV, viscous lidocaine, Maalox.  She was admitted for further management.

## 2025-07-06 NOTE — ASSESSMENT & PLAN NOTE
Likely in the setting of gastritis/PUD.  Could be in the setting of acute pancreatitis given elevated lipase.  CT abdomen and pelvis (6/25) showed no acute process  Symptomatic control

## 2025-07-06 NOTE — ASSESSMENT & PLAN NOTE
Patient's blood pressure range in the last 24 hours was: BP  Min: 176/87  Max: 226/89.The patient's inpatient anti-hypertensive regimen is listed below:  Current Antihypertensives  valsartan tablet 320 mg, Daily, Oral  amLODIPine tablet 5 mg, Daily, Oral  labetaloL injection 10 mg, Every 6 hours PRN, Intravenous  hydrALAZINE injection 10 mg, Every 6 hours PRN, Intravenous    Plan  - BP is controlled, no changes needed to their regimen

## 2025-07-06 NOTE — ASSESSMENT & PLAN NOTE
Hyponatremia is likely due to Dehydration/hypovolemia. The patient's most recent sodium results are listed below.  Recent Labs     07/06/25  0440 07/06/25  0824 07/06/25  1148   * 133* 133*     Plan  - Correct the sodium by 4-6mEq in 24 hours.   - Obtain the following studies: Urine sodium, urine osmolality, serum osmolality.  - Will treat the hyponatremia with IV fluids as follows: 100 ml/hr, if no overcorrection   - Monitor sodium Every 4 hours.   - Patient hyponatremia is stable

## 2025-07-06 NOTE — PLAN OF CARE
07/05/25 9194   Nurse Notification   Bedside Nurse Notified? Yes   Name of Bedside Nurse Gorge GUZMAN   Nurse Notfication Method Secure Chat;Telephone;Vidyo   Nurse Notified Of Flowsheet Documentation;Education;Care Plan   Admission   Initial VN Admission Questions Complete   Communication Issues? Technical Issue  (Phone admit)   Shift   Pain Management Interventions relaxation techniques promoted;quiet environment facilitated   Virtual Nurse - Patient Verbalized Approval Of VN Rounding   Type of Frequent Check   Type Patient Rounds;Telemetry Monitoring   Safety/Activity   Patient Rounds bed wheels locked;bed in low position;visualized patient;call light in patient/parent reach;clutter free environment maintained;ID band on;placement of personal items at bedside   Safety Promotion/Fall Prevention assistive device/personal item within reach;medications reviewed;family to remain at bedside;Fall Risk reviewed with patient/family;family expresses understanding of fall risk and prevention;nonskid shoes/socks when out of bed;room near unit station;side rails raised x 2   Safety Precautions emergency equipment at bedside   Safety Bands on Patient Fall Risk Band   Activity Management Ambulated in room - L4   Activity Assistance Provided assistance, stand-by   Enhanced Safety Measures family to remain at bedside   Positioning   Body Position position changed independently   Head of Bed (HOB) Positioning HOB elevated   Positioning/Transfer Devices pillows;in use     Luxembourger SPEAKING VIRTUAL NURSE:  Called patient's room via telephone; verified spelling of patient's name and birthdate.  Introduced as VN for night shift that will be working with floor nurse and nursing assistant.  Educated on VN's role in patient care and VIP model.  Plan of care reviewed.  Education per flowsheet.   Informed that staff will round on patient every 2 hours but to use call light for any other needs they may have.  Informed of fall risk and fall  precautions; verbalized understanding.  Call light within reach per Gorge LPN.  Opportunity given for questions and questions answered.  Admission assessment questions answered.  Denies complaints or any needs at this time. Instructed to call for assistance.  Will cont to monitor and intervene as needed.

## 2025-07-06 NOTE — PLAN OF CARE
Problem: Adult Inpatient Plan of Care  Goal: Plan of Care Review  Outcome: Progressing  Goal: Patient-Specific Goal (Individualized)  Outcome: Progressing  Goal: Absence of Hospital-Acquired Illness or Injury  Outcome: Progressing  Goal: Optimal Comfort and Wellbeing  Outcome: Progressing  Goal: Readiness for Transition of Care  Outcome: Progressing     Problem: Infection  Goal: Absence of Infection Signs and Symptoms  Outcome: Progressing     Problem: Comorbidity Management  Goal: Blood Pressure in Desired Range  Outcome: Progressing     Problem: Pain Acute  Goal: Optimal Pain Control and Function  Outcome: Progressing     Pt alert and able to make needs known. IVF in progress, rigoberto well. Oral meds rigoberto well, no s/s of adverse effects noted. Bed in lowest position, call light within reach, and instructed to call for any assistance. Continue with plan of care.

## 2025-07-06 NOTE — ED NOTES
Report received from RIKA Finley. Pt resting calmly in stretcher, connected to all monitoring. Updated on plan of care. Verbalized understanding. Denies any complaints. ANNALISA.  
History/Exam/Medical decision making

## 2025-07-06 NOTE — H&P
[facility] - [place], [place] [facility]  [place] [facility]  History & Physical    Patient Name: Monique Merchant  MRN: [id number]  Patient Class: IP- Inpatient  Admission Date: 7/5/2025  Attending Physician: Betina Jackson MD   Primary Care Provider: Unable, To Obtain         Patient information was obtained from patient, past medical records, and ER records.     Subjective:     Principal Problem:Hyponatremia    Chief Complaint:   Chief Complaint   Patient presents with    Abdominal Pain     Pt reports abd pain and generalized weakness x1 week. Pt reports being seen here 10 days ago, prescribed Malox, pepcid and zofran which she has been taking without relief. Pt reports she had a referral to see GI but she called the number on her AVS and they will not answer the phone.         HPI: This is a 75-year-old female with a past medical history of hyperlipidemia, GERD, anxiety, who presents with abdominal pain.    Patient presents for evaluation of abdominal pain that started about 5 days prior to presentation.  She reports epigastric abdominal pain without radiation, associated with chills and diarrhea.  Her last bowel movement was on the morning of presentation.  Additionally, she reports that her blood pressure has been elevated. Of note, the patient had a recent hospitalization (6/28-6/29) for abdominal pain/headaches.  CT abdomen and pelvis (6/25) showed no acute process.  She was discharged to follow with Neurology, and noted to have a sodium of 133 on 6/29.    In the ED, the patient was hypertensive (up to 226/89, MAP: 128).  Labs were remarkable for hyponatremia (121), normocytic anemia (10.9), elevated lipase (222).  Patient was given 500 mL of NS, Zofran 4 mg IV, viscous lidocaine, Maalox.  She was admitted for further management.    A  was utilized during this encounter.     Past Medical History:   Diagnosis Date    Arthritis     Hypertension        History reviewed. No  pertinent surgical history.    Review of patient's allergies indicates:  No Known Allergies    No current facility-administered medications on file prior to encounter.     Current Outpatient Medications on File Prior to Encounter   Medication Sig    albuterol (PROVENTIL/VENTOLIN HFA) 90 mcg/actuation inhaler Inhale 1-2 puffs into the lungs every 6 (six) hours as needed for Wheezing. Rescue    alendronate (FOSAMAX) 70 MG tablet Take 70 mg by mouth every 7 days.    aluminum & magnesium hydroxide-simethicone (MAALOX MAXIMUM STRENGTH) 400-400-40 mg/5 mL suspension Take 15 mLs by mouth every 6 (six) hours as needed for Indigestion.    cetirizine (ZYRTEC) 10 MG tablet Take 1 tablet (10 mg total) by mouth once daily.    famotidine (PEPCID) 20 MG tablet Take 1 tablet (20 mg total) by mouth 2 (two) times daily.    fluticasone propionate (FLONASE) 50 mcg/actuation nasal spray 1 spray (50 mcg total) by Each Nostril route 2 (two) times daily as needed for Rhinitis.    ibuprofen (ADVIL,MOTRIN) 600 MG tablet Take 1 tablet (600 mg total) by mouth every 6 (six) hours as needed for Pain or Temperature greater than (100.5 or greater).    LIDOcaine (LIDODERM) 5 % Place 1 patch onto the skin once daily. Remove & Discard patch within 12 hours or as directed by MD. May use 4% over the counter if not covered by insurance for 15 days    melatonin (MELATIN) 3 mg tablet Take 2 tablets (6 mg total) by mouth nightly as needed for Insomnia.    olmesartan (BENICAR) 40 MG tablet Take 1 tablet (40 mg total) by mouth once daily.    omeprazole (PRILOSEC) 20 MG capsule Take 1 capsule (20 mg total) by mouth once daily.    ondansetron (ZOFRAN) 4 MG tablet Take 1 tablet (4 mg total) by mouth every 8 (eight) hours as needed for Nausea.     Family History    None       Tobacco Use    Smoking status: Never    Smokeless tobacco: Never   Substance and Sexual Activity    Alcohol use: Never    Drug use: Never    Sexual activity: Not on file     Review of  Systems   Respiratory: Negative.     Cardiovascular: Negative.    Gastrointestinal:  Positive for abdominal pain and diarrhea.   Genitourinary: Negative.    Musculoskeletal: Negative.    Neurological: Negative.      Objective:     Vital Signs (Most Recent):  Temp: 97.8 °F (36.6 °C) (07/05/25 1609)  Pulse: 75 (07/05/25 1900)  Resp: 20 (07/05/25 1900)  BP: (!) 203/83 (07/05/25 1900)  SpO2: 100 % (07/05/25 1900) Vital Signs (24h Range):  Temp:  [97.8 °F (36.6 °C)] 97.8 °F (36.6 °C)  Pulse:  [75-80] 75  Resp:  [15-20] 20  SpO2:  [99 %-100 %] 100 %  BP: (176-226)/() 203/83     Weight: 64.9 kg (143 lb 1.3 oz)  Body mass index is 27.03 kg/m².     Physical Exam  Vitals and nursing note reviewed.   Constitutional:       General: She is not in acute distress.     Appearance: She is not ill-appearing.   HENT:      Mouth/Throat:      Mouth: Mucous membranes are moist.   Cardiovascular:      Rate and Rhythm: Normal rate.   Pulmonary:      Effort: Pulmonary effort is normal.   Abdominal:      General: Abdomen is flat.      Tenderness: There is abdominal tenderness (epigastric, mild).   Skin:     General: Skin is warm.   Neurological:      General: No focal deficit present.      Mental Status: She is alert.                Significant Labs: All pertinent labs within the past 24 hours have been reviewed.    Significant Imaging: I have reviewed all pertinent imaging results/findings within the past 24 hours.  Assessment/Plan:     Assessment & Plan  Hyponatremia  Hyponatremia is likely due to Dehydration/hypovolemia. The patient's most recent sodium results are listed below.  Recent Labs     07/05/25  1753   *     Plan  - Correct the sodium by 4-6mEq in 24 hours.   - Obtain the following studies: Urine sodium, urine osmolality, serum osmolality.  - Will treat the hyponatremia with IV fluids as follows: 100 ml/hr, if no overcorrection   - Monitor sodium Every 4 hours.   - Patient hyponatremia is stable    Primary  hypertension  Patient's blood pressure range in the last 24 hours was: BP  Min: 176/87  Max: 226/89.The patient's inpatient anti-hypertensive regimen is listed below:  Current Antihypertensives  valsartan tablet 320 mg, Daily, Oral  amLODIPine tablet 5 mg, Daily, Oral  labetaloL injection 10 mg, Every 6 hours PRN, Intravenous  hydrALAZINE injection 10 mg, Every 6 hours PRN, Intravenous    Plan  - BP is controlled, no changes needed to their regimen    GERD (gastroesophageal reflux disease)  Continue PPI   Outpatient follow-up with GI    Epigastric pain  Likely in the setting of gastritis/PUD.  Could be in the setting of acute pancreatitis given elevated lipase.  CT abdomen and pelvis (6/25) showed no acute process  Symptomatic control        VTE Risk Mitigation (From admission, onward)           Ordered     enoxaparin injection 40 mg  Daily         07/05/25 1901     IP VTE HIGH RISK PATIENT  Once         07/05/25 1900     Place sequential compression device  Until discontinued         07/05/25 1900                         Alok Horne MD  Department of Hospital Medicine  [facility] - Emergency Dept

## 2025-07-06 NOTE — ASSESSMENT & PLAN NOTE
Patient's blood pressure range in the last 24 hours was: BP  Min: 111/69  Max: 226/89.The patient's inpatient anti-hypertensive regimen is listed below:  Current Antihypertensives  valsartan tablet 320 mg, Daily, Oral  amLODIPine tablet 5 mg, Daily, Oral  labetaloL injection 10 mg, Every 6 hours PRN, Intravenous  hydrALAZINE injection 10 mg, Every 6 hours PRN, Intravenous    Plan  - BP is controlled, no changes needed to their regimen

## 2025-07-06 NOTE — SUBJECTIVE & OBJECTIVE
Interval History:   7/6   No acute events overnight.  Patient reports that her abdominal pain has almost resolved.  No bowel movements since yesterday.    She reports that she [place] and vaguely remembers being told that she had ulcers with bleeding.  She was started on some medications and recovered.    Currently feels better but not feeling entirely comfortable to go home.      Review of Systems   Constitutional:  Positive for appetite change (Poor appetite due to nausea, vomiting). Negative for activity change.   HENT: Negative.     Eyes: Negative.    Respiratory:  Negative for chest tightness and shortness of breath.    Cardiovascular:  Negative for chest pain.   Gastrointestinal:  Positive for abdominal pain (Improved). Negative for diarrhea, nausea and vomiting.   Endocrine: Negative.    Genitourinary:  Positive for frequency.   Musculoskeletal: Negative.      Objective:     Vital Signs (Most Recent):  Temp: 98.5 °F (36.9 °C) (07/06/25 1113)  Pulse: 68 (07/06/25 1113)  Resp: 18 (07/06/25 1113)  BP: 111/69 (07/06/25 1113)  SpO2: 98 % (07/06/25 1113) Vital Signs (24h Range):  Temp:  [97.8 °F (36.6 °C)-98.6 °F (37 °C)] 98.5 °F (36.9 °C)  Pulse:  [61-92] 68  Resp:  [15-20] 18  SpO2:  [97 %-100 %] 98 %  BP: (111-226)/() 111/69     Weight: 67 kg (147 lb 11.2 oz)  Body mass index is 27.91 kg/m².    Intake/Output Summary (Last 24 hours) at 7/6/2025 1253  Last data filed at 7/6/2025 0700  Gross per 24 hour   Intake 1538.09 ml   Output --   Net 1538.09 ml         Physical Exam  Vitals and nursing note reviewed.   Constitutional:       General: She is not in acute distress.     Appearance: Normal appearance.   HENT:      Head: Normocephalic and atraumatic.      Nose: Nose normal.   Eyes:      Pupils: Pupils are equal, round, and reactive to light.   Cardiovascular:      Rate and Rhythm: Normal rate and regular rhythm.      Pulses: Normal pulses.      Heart sounds: Normal  heart sounds.   Pulmonary:      Effort: Pulmonary effort is normal. No respiratory distress.      Breath sounds: Normal breath sounds.   Abdominal:      General: There is no distension.      Palpations: Abdomen is soft.      Tenderness: There is no abdominal tenderness.   Musculoskeletal:         General: No swelling. Normal range of motion.      Cervical back: Normal range of motion and neck supple.   Skin:     General: Skin is warm.      Capillary Refill: Capillary refill takes less than 2 seconds.      Coloration: Skin is not jaundiced.   Neurological:      General: No focal deficit present.      Mental Status: She is alert and oriented to person, place, and time.   Psychiatric:         Mood and Affect: Mood normal.               Significant Labs: All pertinent labs within the past 24 hours have been reviewed.  CBC:   Recent Labs   Lab 07/05/25 1753 07/05/25  1805 07/06/25  0440   WBC 8.58  --  7.69   HGB 10.9*  --  10.9*   HCT 31.6* 32* 33.1*     --   --      CMP:   Recent Labs   Lab 07/05/25  1753 07/05/25  2103 07/06/25  0440 07/06/25  0824 07/06/25  1148   *   < > 133* 133* 133*   K 4.7   < > 4.2 4.2 4.2   CL 95   < > 107 105 105   CO2 17*   < > 16* 18* 17*   *   < > 102 120* 106   BUN 20   < > 14 12 12   CREATININE 1.1   < > 0.8 0.9 0.9   CALCIUM 8.7   < > 8.3* 8.5* 9.1   PROT 7.5  --   --   --   --    ALBUMIN 3.7  --   --   --   --    BILITOT 0.3  --   --   --   --    ALKPHOS 52  --   --   --   --    AST 20  --   --   --   --    ALT 18  --   --   --   --    ANIONGAP 9   < > 10 10 11    < > = values in this interval not displayed.       Significant Imaging: I have reviewed all pertinent imaging results/findings within the past 24 hours.

## 2025-07-06 NOTE — SUBJECTIVE & OBJECTIVE
Past Medical History:   Diagnosis Date    Arthritis     Hypertension        History reviewed. No pertinent surgical history.    Review of patient's allergies indicates:  No Known Allergies    No current facility-administered medications on file prior to encounter.     Current Outpatient Medications on File Prior to Encounter   Medication Sig    albuterol (PROVENTIL/VENTOLIN HFA) 90 mcg/actuation inhaler Inhale 1-2 puffs into the lungs every 6 (six) hours as needed for Wheezing. Rescue    alendronate (FOSAMAX) 70 MG tablet Take 70 mg by mouth every 7 days.    aluminum & magnesium hydroxide-simethicone (MAALOX MAXIMUM STRENGTH) 400-400-40 mg/5 mL suspension Take 15 mLs by mouth every 6 (six) hours as needed for Indigestion.    cetirizine (ZYRTEC) 10 MG tablet Take 1 tablet (10 mg total) by mouth once daily.    famotidine (PEPCID) 20 MG tablet Take 1 tablet (20 mg total) by mouth 2 (two) times daily.    fluticasone propionate (FLONASE) 50 mcg/actuation nasal spray 1 spray (50 mcg total) by Each Nostril route 2 (two) times daily as needed for Rhinitis.    ibuprofen (ADVIL,MOTRIN) 600 MG tablet Take 1 tablet (600 mg total) by mouth every 6 (six) hours as needed for Pain or Temperature greater than (100.5 or greater).    LIDOcaine (LIDODERM) 5 % Place 1 patch onto the skin once daily. Remove & Discard patch within 12 hours or as directed by MD. May use 4% over the counter if not covered by insurance for 15 days    melatonin (MELATIN) 3 mg tablet Take 2 tablets (6 mg total) by mouth nightly as needed for Insomnia.    olmesartan (BENICAR) 40 MG tablet Take 1 tablet (40 mg total) by mouth once daily.    omeprazole (PRILOSEC) 20 MG capsule Take 1 capsule (20 mg total) by mouth once daily.    ondansetron (ZOFRAN) 4 MG tablet Take 1 tablet (4 mg total) by mouth every 8 (eight) hours as needed for Nausea.     Family History    None       Tobacco Use    Smoking status: Never    Smokeless tobacco: Never   Substance and Sexual  Activity    Alcohol use: Never    Drug use: Never    Sexual activity: Not on file     Review of Systems   Respiratory: Negative.     Cardiovascular: Negative.    Gastrointestinal:  Positive for abdominal pain and diarrhea.   Genitourinary: Negative.    Musculoskeletal: Negative.    Neurological: Negative.      Objective:     Vital Signs (Most Recent):  Temp: 97.8 °F (36.6 °C) (07/05/25 1609)  Pulse: 75 (07/05/25 1900)  Resp: 20 (07/05/25 1900)  BP: (!) 203/83 (07/05/25 1900)  SpO2: 100 % (07/05/25 1900) Vital Signs (24h Range):  Temp:  [97.8 °F (36.6 °C)] 97.8 °F (36.6 °C)  Pulse:  [75-80] 75  Resp:  [15-20] 20  SpO2:  [99 %-100 %] 100 %  BP: (176-226)/() 203/83     Weight: 64.9 kg (143 lb 1.3 oz)  Body mass index is 27.03 kg/m².     Physical Exam  Vitals and nursing note reviewed.   Constitutional:       General: She is not in acute distress.     Appearance: She is not ill-appearing.   HENT:      Mouth/Throat:      Mouth: Mucous membranes are moist.   Cardiovascular:      Rate and Rhythm: Normal rate.   Pulmonary:      Effort: Pulmonary effort is normal.   Abdominal:      General: Abdomen is flat.      Tenderness: There is abdominal tenderness (epigastric, mild).   Skin:     General: Skin is warm.   Neurological:      General: No focal deficit present.      Mental Status: She is alert.                Significant Labs: All pertinent labs within the past 24 hours have been reviewed.    Significant Imaging: I have reviewed all pertinent imaging results/findings within the past 24 hours.   Warm/Dry

## 2025-07-06 NOTE — NURSING
[facility] [place]  Nurses Note -- 4 Eyes      7/6/2025       Skin assessed on: Q Shift      [x] No Pressure Injuries Present    [x]Prevention Measures Documented    [] Yes LDA  for Pressure Injury Previously documented     [] Yes New Pressure Injury Discovered   [] LDA for New Pressure Injury Added      Attending RN:  Gabriela Quintana RN     Second RN:  THOMAS Pennington

## 2025-07-06 NOTE — NURSING
[facility]  Nurses Note -- 4 Eyes      7/6/2025       Skin assessed on: Q Shift      [x] No Pressure Injuries Present    [x]Prevention Measures Documented    [] Yes LDA  for Pressure Injury Previously documented     [] Yes New Pressure Injury Discovered   [] LDA for New Pressure Injury Added      Attending RN:  Gorge Neil LPN     Second RN:  Carlin MELÉNDEZ

## 2025-07-06 NOTE — ASSESSMENT & PLAN NOTE
-Presented with 5 day history of abdominal pain, nausea, vomiting, diarrhea.  -Reported similar episode last week where she was admitted for 1 day and was discharged home.  -CT abdomen and pelvis done at that time was negative for acute process.    -Given recurrent episode, we will repeat CT abdomen/pelvis.  -Continue IV ppi.    -Continue maintenance fluids.  -Advance diet as tolerated.

## 2025-07-06 NOTE — ASSESSMENT & PLAN NOTE
Hyponatremia is likely due to Dehydration/hypovolemia. The patient's most recent sodium results are listed below.  Recent Labs     07/05/25  1753   *     Plan  - Correct the sodium by 4-6mEq in 24 hours.   - Obtain the following studies: Urine sodium, urine osmolality, serum osmolality.  - Will treat the hyponatremia with IV fluids as follows: 100 ml/hr, if no overcorrection   - Monitor sodium Every 4 hours.   - Patient hyponatremia is stable

## 2025-07-07 LAB
ABSOLUTE EOSINOPHIL (OHS): 0.17 K/UL
ABSOLUTE MONOCYTE (OHS): 1.69 K/UL (ref 0.3–1)
ABSOLUTE NEUTROPHIL COUNT (OHS): 9.21 K/UL (ref 1.8–7.7)
ALBUMIN SERPL BCP-MCNC: 3.4 G/DL (ref 3.5–5.2)
ALP SERPL-CCNC: 55 UNIT/L (ref 40–150)
ALT SERPL W/O P-5'-P-CCNC: 14 UNIT/L (ref 10–44)
ANION GAP (OHS): 9 MMOL/L (ref 8–16)
AST SERPL-CCNC: 18 UNIT/L (ref 11–45)
BASOPHILS # BLD AUTO: 0.05 K/UL
BASOPHILS NFR BLD AUTO: 0.4 %
BILIRUB SERPL-MCNC: 0.3 MG/DL (ref 0.1–1)
BUN SERPL-MCNC: 11 MG/DL (ref 8–23)
CALCIUM SERPL-MCNC: 8.6 MG/DL (ref 8.7–10.5)
CHLORIDE SERPL-SCNC: 104 MMOL/L (ref 95–110)
CO2 SERPL-SCNC: 18 MMOL/L (ref 23–29)
CREAT SERPL-MCNC: 0.8 MG/DL (ref 0.5–1.4)
ERYTHROCYTE [DISTWIDTH] IN BLOOD BY AUTOMATED COUNT: 12 % (ref 11.5–14.5)
GFR SERPLBLD CREATININE-BSD FMLA CKD-EPI: >60 ML/MIN/1.73/M2
GLUCOSE SERPL-MCNC: 101 MG/DL (ref 70–110)
HCT VFR BLD AUTO: 32.2 % (ref 37–48.5)
HGB BLD-MCNC: 10.9 GM/DL (ref 12–16)
IMM GRANULOCYTES # BLD AUTO: 0.07 K/UL (ref 0–0.04)
IMM GRANULOCYTES NFR BLD AUTO: 0.5 % (ref 0–0.5)
LYMPHOCYTES # BLD AUTO: 2.72 K/UL (ref 1–4.8)
MCH RBC QN AUTO: 30 PG (ref 27–31)
MCHC RBC AUTO-ENTMCNC: 33.9 G/DL (ref 32–36)
MCV RBC AUTO: 89 FL (ref 82–98)
NUCLEATED RBC (/100WBC) (OHS): 0 /100 WBC
PLATELET # BLD AUTO: 331 K/UL (ref 150–450)
PMV BLD AUTO: 9.9 FL (ref 9.2–12.9)
POTASSIUM SERPL-SCNC: 4.3 MMOL/L (ref 3.5–5.1)
PROT SERPL-MCNC: 7.4 GM/DL (ref 6–8.4)
RBC # BLD AUTO: 3.63 M/UL (ref 4–5.4)
RELATIVE EOSINOPHIL (OHS): 1.2 %
RELATIVE LYMPHOCYTE (OHS): 19.6 % (ref 18–48)
RELATIVE MONOCYTE (OHS): 12.1 % (ref 4–15)
RELATIVE NEUTROPHIL (OHS): 66.2 % (ref 38–73)
SODIUM SERPL-SCNC: 131 MMOL/L (ref 136–145)
WBC # BLD AUTO: 13.91 K/UL (ref 3.9–12.7)

## 2025-07-07 PROCEDURE — 63600175 PHARM REV CODE 636 W HCPCS: Performed by: STUDENT IN AN ORGANIZED HEALTH CARE EDUCATION/TRAINING PROGRAM

## 2025-07-07 PROCEDURE — 85025 COMPLETE CBC W/AUTO DIFF WBC: CPT | Performed by: STUDENT IN AN ORGANIZED HEALTH CARE EDUCATION/TRAINING PROGRAM

## 2025-07-07 PROCEDURE — 82784 ASSAY IGA/IGD/IGG/IGM EACH: CPT | Performed by: STUDENT IN AN ORGANIZED HEALTH CARE EDUCATION/TRAINING PROGRAM

## 2025-07-07 PROCEDURE — 25500020 PHARM REV CODE 255: Performed by: STUDENT IN AN ORGANIZED HEALTH CARE EDUCATION/TRAINING PROGRAM

## 2025-07-07 PROCEDURE — 86677 HELICOBACTER PYLORI ANTIBODY: CPT | Performed by: STUDENT IN AN ORGANIZED HEALTH CARE EDUCATION/TRAINING PROGRAM

## 2025-07-07 PROCEDURE — 80053 COMPREHEN METABOLIC PANEL: CPT | Performed by: STUDENT IN AN ORGANIZED HEALTH CARE EDUCATION/TRAINING PROGRAM

## 2025-07-07 PROCEDURE — 25000003 PHARM REV CODE 250: Performed by: STUDENT IN AN ORGANIZED HEALTH CARE EDUCATION/TRAINING PROGRAM

## 2025-07-07 PROCEDURE — 36415 COLL VENOUS BLD VENIPUNCTURE: CPT | Performed by: STUDENT IN AN ORGANIZED HEALTH CARE EDUCATION/TRAINING PROGRAM

## 2025-07-07 PROCEDURE — 21400001 HC TELEMETRY ROOM

## 2025-07-07 RX ORDER — SODIUM CHLORIDE, SODIUM LACTATE, POTASSIUM CHLORIDE, CALCIUM CHLORIDE 600; 310; 30; 20 MG/100ML; MG/100ML; MG/100ML; MG/100ML
INJECTION, SOLUTION INTRAVENOUS CONTINUOUS
Status: ACTIVE | OUTPATIENT
Start: 2025-07-07 | End: 2025-07-07

## 2025-07-07 RX ORDER — CEFTRIAXONE 1 G/1
1 INJECTION, POWDER, FOR SOLUTION INTRAMUSCULAR; INTRAVENOUS
Status: DISCONTINUED | OUTPATIENT
Start: 2025-07-07 | End: 2025-07-07

## 2025-07-07 RX ORDER — CEFTRIAXONE 2 G/1
2 INJECTION, POWDER, FOR SOLUTION INTRAMUSCULAR; INTRAVENOUS
Status: DISCONTINUED | OUTPATIENT
Start: 2025-07-08 | End: 2025-07-08 | Stop reason: HOSPADM

## 2025-07-07 RX ORDER — METRONIDAZOLE 500 MG/100ML
500 INJECTION, SOLUTION INTRAVENOUS
Status: DISCONTINUED | OUTPATIENT
Start: 2025-07-07 | End: 2025-07-08 | Stop reason: HOSPADM

## 2025-07-07 RX ADMIN — VALSARTAN 320 MG: 80 TABLET, FILM COATED ORAL at 08:07

## 2025-07-07 RX ADMIN — ENOXAPARIN SODIUM 40 MG: 40 INJECTION SUBCUTANEOUS at 04:07

## 2025-07-07 RX ADMIN — PANTOPRAZOLE SODIUM 40 MG: 40 TABLET, DELAYED RELEASE ORAL at 08:07

## 2025-07-07 RX ADMIN — METRONIDAZOLE 500 MG: 500 INJECTION, SOLUTION INTRAVENOUS at 04:07

## 2025-07-07 RX ADMIN — METRONIDAZOLE 500 MG: 500 INJECTION, SOLUTION INTRAVENOUS at 09:07

## 2025-07-07 RX ADMIN — CEFTRIAXONE 1 G: 1 INJECTION, POWDER, FOR SOLUTION INTRAMUSCULAR; INTRAVENOUS at 08:07

## 2025-07-07 RX ADMIN — AMLODIPINE BESYLATE 5 MG: 5 TABLET ORAL at 08:07

## 2025-07-07 RX ADMIN — IOHEXOL 75 ML: 350 INJECTION, SOLUTION INTRAVENOUS at 01:07

## 2025-07-07 RX ADMIN — SODIUM CHLORIDE, POTASSIUM CHLORIDE, SODIUM LACTATE AND CALCIUM CHLORIDE: 600; 310; 30; 20 INJECTION, SOLUTION INTRAVENOUS at 09:07

## 2025-07-07 NOTE — SUBJECTIVE & OBJECTIVE
Interval History:   7/6   No acute events overnight.  Patient reports that her abdominal pain has almost resolved.  No bowel movements since yesterday.    She reports that she had [place] and vaguely remembers being told that she had ulcers with bleeding.  She was started on some medications and recovered.    Currently feels better but not feeling entirely comfortable to go home.    7/7   Patient reports feeling feverish and having chills overnight.  Her T-max was 100.5° F  She also feels very bloated and any attempts to eat results in increased bloating.  Last bowel movement was yesterday.    Empirically started on ceftriaxone and Flagyl today.  CT abdomen ordered for further evaluation.        Review of Systems   Constitutional:  Positive for appetite change (Poor appetite due to nausea, vomiting). Negative for activity change.   HENT: Negative.     Eyes: Negative.    Respiratory:  Negative for chest tightness and shortness of breath.    Cardiovascular:  Negative for chest pain.   Gastrointestinal:  Positive for abdominal pain (Improved). Negative for diarrhea, nausea and vomiting.   Endocrine: Negative.    Genitourinary:  Positive for frequency.   Musculoskeletal: Negative.      Review of Systems   Constitutional:  Positive for chills and fever.   HENT: Negative.     Eyes: Negative.    Respiratory:  Negative for chest tightness.    Cardiovascular:  Negative for chest pain.   Gastrointestinal:  Positive for abdominal pain and nausea.   Endocrine: Negative.    Genitourinary:  Positive for frequency.   Musculoskeletal:  Negative for arthralgias.     Objective:     Vital Signs (Most Recent):  Temp: 100 °F (37.8 °C) (07/07/25 1136)  Pulse: 93 (07/07/25 1136)  Resp: 18 (07/07/25 1136)  BP: (!) 167/72 (07/07/25 1136)  SpO2: 98 % (07/07/25 1136) Vital Signs (24h Range):  Temp:  [98.8 °F (37.1 °C)-100.5 °F (38.1 °C)] 100 °F (37.8 °C)  Pulse:  [79-99] 93  Resp:  [18] 18  SpO2:  [96 %-99  %] 98 %  BP: (127-167)/(64-80) 167/72     Weight: 67 kg (147 lb 11.2 oz)  Body mass index is 27.91 kg/m².    Intake/Output Summary (Last 24 hours) at 7/7/2025 1425  Last data filed at 7/7/2025 0900  Gross per 24 hour   Intake 1878.63 ml   Output --   Net 1878.63 ml         Physical Exam  Vitals and nursing note reviewed.   Constitutional:       Appearance: Normal appearance.   HENT:      Head: Normocephalic and atraumatic.   Eyes:      Pupils: Pupils are equal, round, and reactive to light.   Cardiovascular:      Rate and Rhythm: Normal rate and regular rhythm.      Pulses: Normal pulses.      Heart sounds: Normal heart sounds.   Pulmonary:      Effort: Pulmonary effort is normal.      Breath sounds: Normal breath sounds. No wheezing.   Abdominal:      General: Bowel sounds are normal.      Palpations: Abdomen is soft.      Tenderness: There is abdominal tenderness (Mild).   Musculoskeletal:         General: Normal range of motion.   Neurological:      Mental Status: She is alert.               Significant Labs: All pertinent labs within the past 24 hours have been reviewed.  CBC:   Recent Labs   Lab 07/05/25  1753 07/05/25  1805 07/06/25  0440 07/07/25  0425   WBC 8.58  --  7.69 13.91*   HGB 10.9*  --  10.9* 10.9*   HCT 31.6* 32* 33.1* 32.2*     --   --  331     CMP:   Recent Labs   Lab 07/05/25  1753 07/05/25  2103 07/06/25  0824 07/06/25  1148 07/07/25  0425   *   < > 133* 133* 131*   K 4.7   < > 4.2 4.2 4.3   CL 95   < > 105 105 104   CO2 17*   < > 18* 17* 18*   *   < > 120* 106 101   BUN 20   < > 12 12 11   CREATININE 1.1   < > 0.9 0.9 0.8   CALCIUM 8.7   < > 8.5* 9.1 8.6*   PROT 7.5  --   --   --  7.4   ALBUMIN 3.7  --   --   --  3.4*   BILITOT 0.3  --   --   --  0.3   ALKPHOS 52  --   --   --  55   AST 20  --   --   --  18   ALT 18  --   --   --  14   ANIONGAP 9   < > 10 11 9    < > = values in this interval not displayed.       Significant Imaging: I have reviewed all pertinent imaging  results/findings within the past 24 hours.

## 2025-07-07 NOTE — ASSESSMENT & PLAN NOTE
Patient's blood pressure range in the last 24 hours was: BP  Min: 127/74  Max: 167/72.The patient's inpatient anti-hypertensive regimen is listed below:  Current Antihypertensives  valsartan tablet 320 mg, Daily, Oral  amLODIPine tablet 5 mg, Daily, Oral  labetaloL injection 10 mg, Every 6 hours PRN, Intravenous  hydrALAZINE injection 10 mg, Every 6 hours PRN, Intravenous    Plan  - BP is controlled, no changes needed to their regimen

## 2025-07-07 NOTE — PLAN OF CARE
07/07/25 1615   Rounds   Attendance Provider;   Discharge Plan A Home with family   Why the patient remains in the hospital Requires continued medical care   Transition of Care Barriers None

## 2025-07-07 NOTE — ASSESSMENT & PLAN NOTE
-Presented with 5 days' history of abdominal pain.  Seen in the ER last week.  CT abdomen/pelvis on 06/25 negative.    -Continues to have persistent abdominal pain, nausea, vomiting.    -Repeat CT abdomen/pelvis done on 07/07 shows acute diverticulitis of sigmoid colon.    -Start IV ceftriaxone and IV Flagyl.    -Maintenance IV fluids.    -As needed pain control and nausea control.

## 2025-07-07 NOTE — ASSESSMENT & PLAN NOTE
Hyponatremia is likely due to Dehydration/hypovolemia. The patient's most recent sodium results are listed below.  Recent Labs     07/06/25  0824 07/06/25  1148 07/07/25  0425   * 133* 131*     Plan  - Correct the sodium by 4-6mEq in 24 hours.   - Obtain the following studies: Urine sodium, urine osmolality, serum osmolality.  - Will treat the hyponatremia with IV fluids as follows: 100 ml/hr, if no overcorrection   - Patient hyponatremia is stable

## 2025-07-07 NOTE — NURSING
[facility] [place]  Nurses Note -- 4 Eyes        Date:  07/07/2025        Skin assessed on:  Q shift        [x] No Pressure Injuries Present                 []Prevention Measures Documented     [] Yes LDA Previously documented      [] Yes New Pressure Injury Discovered              [] LDA Added        Attending RN:  RIKA Chávez     Second RN:   THOMAS Pennington

## 2025-07-07 NOTE — PROGRESS NOTES
[facility] Medicine  Progress Note    Patient Name: Monique Merchant  MRN: [id number]  Patient Class: IP- Inpatient   Admission Date: 7/5/2025  Length of Stay: 2 days  Attending Physician: Betina Jackson MD  Primary Care Provider: Otis Justin MD        Subjective     Principal Problem:Epigastric pain        HPI:  This is a 75-year-old female with a past medical history of hyperlipidemia, GERD, anxiety, who presents with abdominal pain.    Patient presents for evaluation of abdominal pain that started about 5 days prior to presentation.  She reports epigastric abdominal pain without radiation, associated with chills and diarrhea.  Her last bowel movement was on the morning of presentation.  Additionally, she reports that her blood pressure has been elevated. Of note, the patient had a recent hospitalization (6/28-6/29) for abdominal pain/headaches.  CT abdomen and pelvis (6/25) showed no acute process.  She was discharged to follow with Neurology, and noted to have a sodium of 133 on 6/29.    In the ED, the patient was hypertensive (up to 226/89, MAP: 128).  Labs were remarkable for hyponatremia (121), normocytic anemia (10.9), elevated lipase (222).  Patient was given 500 mL of NS, Zofran 4 mg IV, viscous lidocaine, Maalox.  She was admitted for further management.    Overview/Hospital Course:  No notes on file    Interval History:   7/6   No acute events overnight.  Patient reports that her abdominal pain has almost resolved.  No bowel movements since yesterday.    She reports that [place] and vaguely remembers being told that she had ulcers with bleeding.  She was started on some medications and recovered.    Currently feels better but not feeling entirely comfortable to go home.    7/7   Patient reports feeling feverish and having chills overnight.  Her T-max was 100.5° F  She also feels very bloated and any attempts  to eat results in increased bloating.  Last bowel movement was yesterday.    CT abdomen/pelvis done today shows acute diverticulitis.  Started on ceftriaxone and Flagyl today.        Review of Systems   Constitutional:  Positive for appetite change (Poor appetite due to nausea, vomiting). Negative for activity change.   HENT: Negative.     Eyes: Negative.    Respiratory:  Negative for chest tightness and shortness of breath.    Cardiovascular:  Negative for chest pain.   Gastrointestinal:  Positive for abdominal pain (Improved). Negative for diarrhea, nausea and vomiting.   Endocrine: Negative.    Genitourinary:  Positive for frequency.   Musculoskeletal: Negative.      Review of Systems   Constitutional:  Positive for chills and fever.   HENT: Negative.     Eyes: Negative.    Respiratory:  Negative for chest tightness.    Cardiovascular:  Negative for chest pain.   Gastrointestinal:  Positive for abdominal pain and nausea.   Endocrine: Negative.    Genitourinary:  Positive for frequency.   Musculoskeletal:  Negative for arthralgias.     Objective:     Vital Signs (Most Recent):  Temp: 100 °F (37.8 °C) (07/07/25 1136)  Pulse: 93 (07/07/25 1136)  Resp: 18 (07/07/25 1136)  BP: (!) 167/72 (07/07/25 1136)  SpO2: 98 % (07/07/25 1136) Vital Signs (24h Range):  Temp:  [98.8 °F (37.1 °C)-100.5 °F (38.1 °C)] 100 °F (37.8 °C)  Pulse:  [79-99] 93  Resp:  [18] 18  SpO2:  [96 %-99 %] 98 %  BP: (127-167)/(64-80) 167/72     Weight: 67 kg (147 lb 11.2 oz)  Body mass index is 27.91 kg/m².    Intake/Output Summary (Last 24 hours) at 7/7/2025 1425  Last data filed at 7/7/2025 0900  Gross per 24 hour   Intake 1878.63 ml   Output --   Net 1878.63 ml         Physical Exam  Vitals and nursing note reviewed.   Constitutional:       Appearance: Normal appearance.   HENT:      Head: Normocephalic and atraumatic.   Eyes:      Pupils: Pupils are equal, round, and reactive to light.   Cardiovascular:      Rate and Rhythm: Normal rate and  regular rhythm.      Pulses: Normal pulses.      Heart sounds: Normal heart sounds.   Pulmonary:      Effort: Pulmonary effort is normal.      Breath sounds: Normal breath sounds. No wheezing.   Abdominal:      General: Bowel sounds are normal.      Palpations: Abdomen is soft.      Tenderness: There is abdominal tenderness (Mild).   Musculoskeletal:         General: Normal range of motion.   Neurological:      Mental Status: She is alert.               Significant Labs: All pertinent labs within the past 24 hours have been reviewed.  CBC:   Recent Labs   Lab 07/05/25 1753 07/05/25  1805 07/06/25  0440 07/07/25  0425   WBC 8.58  --  7.69 13.91*   HGB 10.9*  --  10.9* 10.9*   HCT 31.6* 32* 33.1* 32.2*     --   --  331     CMP:   Recent Labs   Lab 07/05/25 1753 07/05/25  2103 07/06/25  0824 07/06/25  1148 07/07/25  0425   *   < > 133* 133* 131*   K 4.7   < > 4.2 4.2 4.3   CL 95   < > 105 105 104   CO2 17*   < > 18* 17* 18*   *   < > 120* 106 101   BUN 20   < > 12 12 11   CREATININE 1.1   < > 0.9 0.9 0.8   CALCIUM 8.7   < > 8.5* 9.1 8.6*   PROT 7.5  --   --   --  7.4   ALBUMIN 3.7  --   --   --  3.4*   BILITOT 0.3  --   --   --  0.3   ALKPHOS 52  --   --   --  55   AST 20  --   --   --  18   ALT 18  --   --   --  14   ANIONGAP 9   < > 10 11 9    < > = values in this interval not displayed.       Significant Imaging: I have reviewed all pertinent imaging results/findings within the past 24 hours.      Assessment & Plan  Hyponatremia  Hyponatremia is likely due to Dehydration/hypovolemia. The patient's most recent sodium results are listed below.  Recent Labs     07/06/25  0824 07/06/25  1148 07/07/25  0425   * 133* 131*     Plan  - Correct the sodium by 4-6mEq in 24 hours.   - Obtain the following studies: Urine sodium, urine osmolality, serum osmolality.  - Will treat the hyponatremia with IV fluids as follows: 100 ml/hr, if no overcorrection   - Patient hyponatremia is stable    Primary  hypertension  Patient's blood pressure range in the last 24 hours was: BP  Min: 127/74  Max: 167/72.The patient's inpatient anti-hypertensive regimen is listed below:  Current Antihypertensives  valsartan tablet 320 mg, Daily, Oral  amLODIPine tablet 5 mg, Daily, Oral  labetaloL injection 10 mg, Every 6 hours PRN, Intravenous  hydrALAZINE injection 10 mg, Every 6 hours PRN, Intravenous    Plan  - BP is controlled, no changes needed to their regimen    GERD (gastroesophageal reflux disease)  Continue PPI   Outpatient follow-up with GI    Acute diverticulitis  -Presented with 5 days' history of abdominal pain.  Seen in the ER last week.  CT abdomen/pelvis on 06/25 negative.    -Continues to have persistent abdominal pain, nausea, vomiting.    -Repeat CT abdomen/pelvis done on 07/07 shows acute diverticulitis of sigmoid colon.    -Start IV ceftriaxone and IV Flagyl.    -Maintenance IV fluids.    -As needed pain control and nausea control.      VTE Risk Mitigation (From admission, onward)           Ordered     enoxaparin injection 40 mg  Daily         07/05/25 1901     IP VTE HIGH RISK PATIENT  Once         07/05/25 1900     Place sequential compression device  Until discontinued         07/05/25 1900                    Discharge Planning   SAVANNA: 7/8/2025     Code Status: Full Code   Medical Readiness for Discharge Date:   Discharge Plan A: Home with family                        Betina Jackson MD  Department of Hospital Medicine   [facility]

## 2025-07-07 NOTE — NURSING
[facility] [place]  Nurses Note -- 4 Eyes      7/7/2025       Skin assessed on: Q Shift      [x] No Pressure Injuries Present    [x]Prevention Measures Documented    [] Yes LDA  for Pressure Injury Previously documented     [] Yes New Pressure Injury Discovered   [] LDA for New Pressure Injury Added      Attending RN:  Gorge Neil LPN     Second RN:  Gabriela MELÉNDEZ

## 2025-07-07 NOTE — PLAN OF CARE
Problem: Adult Inpatient Plan of Care  Goal: Plan of Care Review  Outcome: Progressing  Goal: Patient-Specific Goal (Individualized)  Outcome: Progressing  Goal: Absence of Hospital-Acquired Illness or Injury  Outcome: Progressing  Goal: Optimal Comfort and Wellbeing  Outcome: Progressing  Goal: Readiness for Transition of Care  Outcome: Progressing     Problem: Infection  Goal: Absence of Infection Signs and Symptoms  Outcome: Progressing     Problem: Comorbidity Management  Goal: Blood Pressure in Desired Range  Outcome: Progressing     Problem: Pain Acute  Goal: Optimal Pain Control and Function  Outcome: Progressing

## 2025-07-08 VITALS
SYSTOLIC BLOOD PRESSURE: 125 MMHG | HEART RATE: 84 BPM | WEIGHT: 147.69 LBS | HEIGHT: 61 IN | BODY MASS INDEX: 27.88 KG/M2 | RESPIRATION RATE: 20 BRPM | TEMPERATURE: 98 F | DIASTOLIC BLOOD PRESSURE: 80 MMHG | OXYGEN SATURATION: 98 %

## 2025-07-08 LAB
ABSOLUTE EOSINOPHIL (OHS): 0.25 K/UL
ABSOLUTE MONOCYTE (OHS): 1.38 K/UL (ref 0.3–1)
ABSOLUTE NEUTROPHIL COUNT (OHS): 7.11 K/UL (ref 1.8–7.7)
ALBUMIN SERPL BCP-MCNC: 3.1 G/DL (ref 3.5–5.2)
ALP SERPL-CCNC: 52 UNIT/L (ref 40–150)
ALT SERPL W/O P-5'-P-CCNC: 12 UNIT/L (ref 10–44)
ANION GAP (OHS): 10 MMOL/L (ref 8–16)
AST SERPL-CCNC: 14 UNIT/L (ref 11–45)
BASOPHILS # BLD AUTO: 0.05 K/UL
BASOPHILS NFR BLD AUTO: 0.4 %
BILIRUB SERPL-MCNC: 0.4 MG/DL (ref 0.1–1)
BUN SERPL-MCNC: 10 MG/DL (ref 8–23)
CALCIUM SERPL-MCNC: 8.7 MG/DL (ref 8.7–10.5)
CHLORIDE SERPL-SCNC: 105 MMOL/L (ref 95–110)
CO2 SERPL-SCNC: 20 MMOL/L (ref 23–29)
CREAT SERPL-MCNC: 0.8 MG/DL (ref 0.5–1.4)
ERYTHROCYTE [DISTWIDTH] IN BLOOD BY AUTOMATED COUNT: 12.3 % (ref 11.5–14.5)
GFR SERPLBLD CREATININE-BSD FMLA CKD-EPI: >60 ML/MIN/1.73/M2
GLUCOSE SERPL-MCNC: 92 MG/DL (ref 70–110)
HCT VFR BLD AUTO: 31.1 % (ref 37–48.5)
HGB BLD-MCNC: 10.5 GM/DL (ref 12–16)
IGA SERPL-MCNC: 226 MG/DL (ref 40–350)
IGG SERPL-MCNC: 1388 MG/DL (ref 650–1600)
IGM SERPL-MCNC: 84 MG/DL (ref 50–300)
IMM GRANULOCYTES # BLD AUTO: 0.05 K/UL (ref 0–0.04)
IMM GRANULOCYTES NFR BLD AUTO: 0.4 % (ref 0–0.5)
LYMPHOCYTES # BLD AUTO: 3.28 K/UL (ref 1–4.8)
MCH RBC QN AUTO: 30.6 PG (ref 27–31)
MCHC RBC AUTO-ENTMCNC: 33.8 G/DL (ref 32–36)
MCV RBC AUTO: 91 FL (ref 82–98)
NUCLEATED RBC (/100WBC) (OHS): 0 /100 WBC
PLATELET # BLD AUTO: 326 K/UL (ref 150–450)
PMV BLD AUTO: 9.1 FL (ref 9.2–12.9)
POTASSIUM SERPL-SCNC: 4.2 MMOL/L (ref 3.5–5.1)
PROT SERPL-MCNC: 7.4 GM/DL (ref 6–8.4)
RBC # BLD AUTO: 3.43 M/UL (ref 4–5.4)
RELATIVE EOSINOPHIL (OHS): 2.1 %
RELATIVE LYMPHOCYTE (OHS): 27.1 % (ref 18–48)
RELATIVE MONOCYTE (OHS): 11.4 % (ref 4–15)
RELATIVE NEUTROPHIL (OHS): 58.6 % (ref 38–73)
SODIUM SERPL-SCNC: 135 MMOL/L (ref 136–145)
WBC # BLD AUTO: 12.12 K/UL (ref 3.9–12.7)

## 2025-07-08 PROCEDURE — 85025 COMPLETE CBC W/AUTO DIFF WBC: CPT | Performed by: STUDENT IN AN ORGANIZED HEALTH CARE EDUCATION/TRAINING PROGRAM

## 2025-07-08 PROCEDURE — 63600175 PHARM REV CODE 636 W HCPCS: Performed by: STUDENT IN AN ORGANIZED HEALTH CARE EDUCATION/TRAINING PROGRAM

## 2025-07-08 PROCEDURE — 80053 COMPREHEN METABOLIC PANEL: CPT | Performed by: STUDENT IN AN ORGANIZED HEALTH CARE EDUCATION/TRAINING PROGRAM

## 2025-07-08 PROCEDURE — 25000003 PHARM REV CODE 250: Performed by: STUDENT IN AN ORGANIZED HEALTH CARE EDUCATION/TRAINING PROGRAM

## 2025-07-08 PROCEDURE — 36415 COLL VENOUS BLD VENIPUNCTURE: CPT | Performed by: STUDENT IN AN ORGANIZED HEALTH CARE EDUCATION/TRAINING PROGRAM

## 2025-07-08 RX ORDER — AMOXICILLIN AND CLAVULANATE POTASSIUM 875; 125 MG/1; MG/1
1 TABLET, FILM COATED ORAL EVERY 12 HOURS
Qty: 10 TABLET | Refills: 0 | Status: SHIPPED | OUTPATIENT
Start: 2025-07-08 | End: 2025-07-13

## 2025-07-08 RX ORDER — ONDANSETRON 4 MG/1
4 TABLET, FILM COATED ORAL EVERY 8 HOURS PRN
Qty: 10 TABLET | Refills: 0 | Status: SHIPPED | OUTPATIENT
Start: 2025-07-08

## 2025-07-08 RX ADMIN — CEFTRIAXONE SODIUM 2 G: 2 INJECTION, POWDER, FOR SOLUTION INTRAMUSCULAR; INTRAVENOUS at 09:07

## 2025-07-08 RX ADMIN — PANTOPRAZOLE SODIUM 40 MG: 40 TABLET, DELAYED RELEASE ORAL at 09:07

## 2025-07-08 RX ADMIN — METRONIDAZOLE 500 MG: 500 INJECTION, SOLUTION INTRAVENOUS at 12:07

## 2025-07-08 RX ADMIN — METRONIDAZOLE 500 MG: 500 INJECTION, SOLUTION INTRAVENOUS at 09:07

## 2025-07-08 RX ADMIN — AMLODIPINE BESYLATE 5 MG: 5 TABLET ORAL at 09:07

## 2025-07-08 RX ADMIN — VALSARTAN 320 MG: 80 TABLET, FILM COATED ORAL at 09:07

## 2025-07-08 NOTE — DISCHARGE SUMMARY
[facility]  Discharge Summary      Patient Name: Monique Merchant  MRN: [id number]  [facility]: [id number]  Patient Class: IP- Inpatient  Admission Date: 7/5/2025  Hospital Length of Stay: 3 days  Discharge Date and Time: 7/8/2025 12:19 PM  Attending Physician: Melissa att. providers found   Discharging Provider: Sakina Shipley DO  Primary Care Provider: Otis Justin MD    Primary Care Team: Networked reference to record PCT     HPI:   This is a 75-year-old female with a past medical history of hyperlipidemia, GERD, anxiety, who presents with abdominal pain.    Patient presents for evaluation of abdominal pain that started about 5 days prior to presentation.  She reports epigastric abdominal pain without radiation, associated with chills and diarrhea.  Her last bowel movement was on the morning of presentation.  Additionally, she reports that her blood pressure has been elevated. Of note, the patient had a recent hospitalization (6/28-6/29) for abdominal pain/headaches.  CT abdomen and pelvis (6/25) showed no acute process.  She was discharged to follow with Neurology, and noted to have a sodium of 133 on 6/29.    In the ED, the patient was hypertensive (up to 226/89, MAP: 128).  Labs were remarkable for hyponatremia (121), normocytic anemia (10.9), elevated lipase (222).  Patient was given 500 mL of NS, Zofran 4 mg IV, viscous lidocaine, Maalox.  She was admitted for further management.    * No surgery found *      Hospital Course:   75-year-old female with a past medical history of hyperlipidemia, GERD, anxiety admitted on 07/05/2025 acute diverticulitis.  Presented with abdominal pain associated with diarrhea. CT abdomen w/Acute diverticulitis in the descending colon.  No abscess or free perforation. Started on IVF and antibiotics.  Abdominal pain improved.  Diet advanced as tolerated.  Patient tolerated regular diet.  Leukocytosis resolved.     Patient admits  feeling better overall.  Able to tolerate diet without any difficulty and without any pain. Pt denies any fever, headaches, chest pain, shortness of breath, palpitations, abdominal pain, nausea, vomiting, or any new weaknesses. Feels ready to go home. Patient's exam on discharge was as follow: Patient is alert and oriented, appears in no acute distress, heart with regular rate and rhythm, lungs clear to asculation with non-labored breathing, abdomen soft, and nontender.  Bilateral lower extremities without any edema or calf tenderness.     Patient was counseled regarding any abnormal labs, differential diagnosis, treatment options, risk-benefit, lifestyle changes, prognosis, current condition, and medications. Patient was interactive and attentive.  Patient's questions were answered in a respectful and timely manner. Patient was instructed to follow-up with PCP within 1 week and to continue taking medications as prescribed. Also, extensively discussed the risks, benefits, and side effects of patient's medications. Discussed with patient about any medication changes. Patient verbalized understanding and agrees to treatment plan.  Patient is stable for discharge.  Patient has no other questions or concerns at this time.  ED precautions discussed with the patient.     line used. Lakisha Castro [id number]    Vital signs are stable. Ambulating without any difficulty. Tolerating p.o. intake without any nausea or vomiting. Afebrile for over 24 hours. Patient is in stable condition and has no questions or concerns. Patient will be discharge to home once transportation secured . Prescriptions sent to pharmacy.  CM/SW to assist with discharge planning.     Vitals:    07/08/25 0324 07/08/25 0343 07/08/25 0723 07/08/25 1128   BP:  (!) 95/57 108/64 125/80   BP Location:  Left arm Left arm Left arm   Patient Position:  Lying Lying Sitting   Pulse: 68 78 66 84   Resp:  17 18 20   Temp:  98.6 °F (37 °C) 98.1 °F (36.7  °C) 98 °F (36.7 °C)   TempSrc:   Oral Oral   SpO2:  97% 97% 98%   Weight:       Height:                Goals of Care Treatment Preferences:  Code Status: Full Code         Consults:   Consults (From admission, onward)          Status Ordering Provider     Inpatient consult to Social Work  Once        Provider:  (Not yet assigned)    Completed KALI MEADOWS            Assessment & Plan  Primary hypertension  Patient's blood pressure range in the last 24 hours was: BP  Min: 95/57  Max: 125/80.The patient's inpatient anti-hypertensive regimen is listed below:  Current Antihypertensives       Plan  - BP is controlled, no changes needed to their regimen    Acute diverticulitis  -Presented with 5 days' history of abdominal pain.  Seen in the ER last week.  CT abdomen/pelvis on 06/25 negative.    -Continues to have persistent abdominal pain, nausea, vomiting.    -Repeat CT abdomen/pelvis done on 07/07 shows acute diverticulitis of sigmoid colon.    -Start IV ceftriaxone and IV Flagyl.    -Maintenance IV fluids.    -As needed pain control and nausea control.      Final Active Diagnoses:    Diagnosis Date Noted POA    PRINCIPAL PROBLEM:  Acute diverticulitis [R10.13] 06/28/2025 Yes    Hyponatremia [E87.1] 07/05/2025 Yes    Primary hypertension [I10] 06/28/2025 Yes    GERD (gastroesophageal reflux disease) [K21.9] 06/28/2025 Yes      Problems Resolved During this Admission:       Discharged Condition: stable    Disposition: Home or Self Care    Follow Up:   Follow-up Information       Otis Justin MD. Call.    Specialty: Pediatrics  Why: [facility] Follow-up needed in 2 weeks  Contact information:  [address] [facility] [address] [facility] [place] [address]  [phone]                           Patient Instructions:      Diet Cardiac     Notify your health care provider if you experience any of the following:  temperature >100.4     Notify your health care provider if you  experience any of the following:  persistent nausea and vomiting or diarrhea     Notify your health care provider if you experience any of the following:  severe uncontrolled pain     Notify your health care provider if you experience any of the following:  increased confusion or weakness     Activity as tolerated       Significant Diagnostic Studies: Labs: All labs within the past 24 hours have been reviewed      Recent Results (from the past 100 hours)   Comp. Metabolic Panel    Collection Time: 07/05/25  5:53 PM   Result Value Ref Range    Sodium 121 (L) 136 - 145 mmol/L    Potassium 4.7 3.5 - 5.1 mmol/L    Chloride 95 95 - 110 mmol/L    CO2 17 (L) 23 - 29 mmol/L    Glucose 112 (H) 70 - 110 mg/dL    BUN 20 8 - 23 mg/dL    Creatinine 1.1 0.5 - 1.4 mg/dL    Calcium 8.7 8.7 - 10.5 mg/dL    Protein Total 7.5 6.0 - 8.4 gm/dL    Albumin 3.7 3.5 - 5.2 g/dL    Bilirubin Total 0.3 0.1 - 1.0 mg/dL    ALP 52 40 - 150 unit/L    AST 20 11 - 45 unit/L    ALT 18 10 - 44 unit/L    Anion Gap 9 8 - 16 mmol/L    eGFR 53 (L) >60 mL/min/1.73/m2   Lipase    Collection Time: 07/05/25  5:53 PM   Result Value Ref Range    Lipase Level 222 (H) 4 - 60 U/L   Magnesium    Collection Time: 07/05/25  5:53 PM   Result Value Ref Range    Magnesium  1.9 1.6 - 2.6 mg/dL   CBC with Differential    Collection Time: 07/05/25  5:53 PM   Result Value Ref Range    WBC 8.58 3.90 - 12.70 K/uL    RBC 3.59 (L) 4.00 - 5.40 M/uL    HGB 10.9 (L) 12.0 - 16.0 gm/dL    HCT 31.6 (L) 37.0 - 48.5 %    MCV 88 82 - 98 fL    MCH 30.4 27.0 - 31.0 pg    MCHC 34.5 32.0 - 36.0 g/dL    RDW 11.8 11.5 - 14.5 %    Platelet Count 315 150 - 450 K/uL    MPV 9.2 9.2 - 12.9 fL    Nucleated RBC 0 <=0 /100 WBC    Neut % 60.9 38 - 73 %    Lymph % 23.8 18 - 48 %    Mono % 10.8 4 - 15 %    Eos % 3.6 <=8 %    Basophil % 0.6 <=1.9 %    Imm Grans % 0.3 0.0 - 0.5 %    Neut # 5.22 1.8 - 7.7 K/uL    Lymph # 2.04 1 - 4.8 K/uL    Mono # 0.93 0.3 - 1 K/uL    Eos # 0.31 <=0.5 K/uL    Baso # 0.05  <=0.2 K/uL    Imm Grans # 0.03 0.00 - 0.04 K/uL   Phosphorus    Collection Time: 07/05/25  5:53 PM   Result Value Ref Range    Phosphorus Level 3.6 2.7 - 4.5 mg/dL   ISTAT PROCEDURE    Collection Time: 07/05/25  6:05 PM   Result Value Ref Range    POC Glucose 111 (H) 70 - 110 mg/dL    POC BUN 20 6 - 30 mg/dL    POC Creatinine 1.2 0.5 - 1.4 mg/dL    POC Sodium 125 (L) 136 - 145 mmol/L    POC Potassium 4.7 3.5 - 5.1 mmol/L    POC Chloride 94 (L) 95 - 110 mmol/L    POC TCO2 (MEASURED) 20 (L) 23 - 29 mmol/L    POC Anion Gap 17 (H) 8 - 16 mmol/L    POC Ionized Calcium 1.21 1.06 - 1.42 mmol/L    POC Hematocrit 32 (L) 36 - 54 %PCV    Sample VENOUS     Site Other     Allens Test N/A    Osmolality, Serum    Collection Time: 07/05/25  6:32 PM   Result Value Ref Range    Osmolality 263 (L) 275 - 295 mOsm/kg   Uric Acid    Collection Time: 07/05/25  6:32 PM   Result Value Ref Range    Uric Acid 4.7 2.4 - 5.7 mg/dL   Basic metabolic panel    Collection Time: 07/05/25  9:03 PM   Result Value Ref Range    Sodium 125 (L) 136 - 145 mmol/L    Potassium 4.3 3.5 - 5.1 mmol/L    Chloride 99 95 - 110 mmol/L    CO2 16 (L) 23 - 29 mmol/L    Glucose 91 70 - 110 mg/dL    BUN 18 8 - 23 mg/dL    Creatinine 1.0 0.5 - 1.4 mg/dL    Calcium 8.3 (L) 8.7 - 10.5 mg/dL    Anion Gap 10 8 - 16 mmol/L    eGFR 59 (L) >60 mL/min/1.73/m2   Urinalysis, Reflex to Urine Culture Urine, Clean Catch    Collection Time: 07/05/25  9:28 PM    Specimen: Urine   Result Value Ref Range    Color, UA Yellow Straw, Nannette, Yellow, Light-Orange    Appearance, UA Clear Clear    pH, UA 7.0 5.0 - 8.0    Spec Grav UA 1.010 1.005 - 1.030    Protein, UA Negative Negative    Glucose, UA Negative Negative    Ketones, UA Negative Negative    Bilirubin, UA Negative Negative    Blood, UA Negative Negative    Nitrites, UA Negative Negative    Urobilinogen, UA Negative <2.0 EU/dL    Leukocyte Esterase, UA Trace (A) Negative   Sodium, Random Urine    Collection Time: 07/05/25  9:28 PM    Result Value Ref Range    Urine Sodium 30 20 - 250 mmol/L   Osmolality, Urine    Collection Time: 07/05/25  9:28 PM   Result Value Ref Range    Urine Osmolality 193 50 - 1,200 mOsm/kg   Creatinine, Random Urine    Collection Time: 07/05/25  9:28 PM   Result Value Ref Range    Urine Creatinine 30.3 15.0 - 325.0 mg/dL   GREY TOP URINE HOLD    Collection Time: 07/05/25  9:28 PM   Result Value Ref Range    Extra Tube Hold for add-ons.    Urinalysis Microscopic    Collection Time: 07/05/25  9:28 PM   Result Value Ref Range    WBC, UA 1 0 - 5 /HPF    Bacteria, UA Occasional None, Rare, Occasional /HPF    Non-Squamous Epithelial Cells <1 (H) <=0 /HPF    Microscopic Comment     Basic metabolic panel    Collection Time: 07/05/25 11:58 PM   Result Value Ref Range    Sodium 129 (L) 136 - 145 mmol/L    Potassium 4.1 3.5 - 5.1 mmol/L    Chloride 102 95 - 110 mmol/L    CO2 16 (L) 23 - 29 mmol/L    Glucose 96 70 - 110 mg/dL    BUN 16 8 - 23 mg/dL    Creatinine 0.9 0.5 - 1.4 mg/dL    Calcium 8.3 (L) 8.7 - 10.5 mg/dL    Anion Gap 11 8 - 16 mmol/L    eGFR >60 >60 mL/min/1.73/m2   Basic metabolic panel    Collection Time: 07/06/25  4:40 AM   Result Value Ref Range    Sodium 133 (L) 136 - 145 mmol/L    Potassium 4.2 3.5 - 5.1 mmol/L    Chloride 107 95 - 110 mmol/L    CO2 16 (L) 23 - 29 mmol/L    Glucose 102 70 - 110 mg/dL    BUN 14 8 - 23 mg/dL    Creatinine 0.8 0.5 - 1.4 mg/dL    Calcium 8.3 (L) 8.7 - 10.5 mg/dL    Anion Gap 10 8 - 16 mmol/L    eGFR >60 >60 mL/min/1.73/m2   Phosphorus    Collection Time: 07/06/25  4:40 AM   Result Value Ref Range    Phosphorus Level 3.0 2.7 - 4.5 mg/dL   Magnesium    Collection Time: 07/06/25  4:40 AM   Result Value Ref Range    Magnesium  2.0 1.6 - 2.6 mg/dL   CBC with Differential    Collection Time: 07/06/25  4:40 AM   Result Value Ref Range    WBC 7.69 3.90 - 12.70 K/uL    RBC 3.56 (L) 4.00 - 5.40 M/uL    HGB 10.9 (L) 12.0 - 16.0 gm/dL    HCT 33.1 (L) 37.0 - 48.5 %    MCV 93 82 - 98 fL    MCH  30.6 27.0 - 31.0 pg    MCHC 32.9 32.0 - 36.0 g/dL    RDW 11.9 11.5 - 14.5 %    Platelet Count      MPV      Nucleated RBC 0 <=0 /100 WBC    Neut % 50.2 38 - 73 %    Lymph % 31.3 18 - 48 %    Mono % 12.6 4 - 15 %    Eos % 4.3 <=8 %    Basophil % 0.9 <=1.9 %    Imm Grans % 0.7 (H) 0.0 - 0.5 %    Neut # 3.86 1.8 - 7.7 K/uL    Lymph # 2.41 1 - 4.8 K/uL    Mono # 0.97 0.3 - 1 K/uL    Eos # 0.33 <=0.5 K/uL    Baso # 0.07 <=0.2 K/uL    Imm Grans # 0.05 (H) 0.00 - 0.04 K/uL   Basic metabolic panel    Collection Time: 07/06/25  8:24 AM   Result Value Ref Range    Sodium 133 (L) 136 - 145 mmol/L    Potassium 4.2 3.5 - 5.1 mmol/L    Chloride 105 95 - 110 mmol/L    CO2 18 (L) 23 - 29 mmol/L    Glucose 120 (H) 70 - 110 mg/dL    BUN 12 8 - 23 mg/dL    Creatinine 0.9 0.5 - 1.4 mg/dL    Calcium 8.5 (L) 8.7 - 10.5 mg/dL    Anion Gap 10 8 - 16 mmol/L    eGFR >60 >60 mL/min/1.73/m2   Basic metabolic panel    Collection Time: 07/06/25 11:48 AM   Result Value Ref Range    Sodium 133 (L) 136 - 145 mmol/L    Potassium 4.2 3.5 - 5.1 mmol/L    Chloride 105 95 - 110 mmol/L    CO2 17 (L) 23 - 29 mmol/L    Glucose 106 70 - 110 mg/dL    BUN 12 8 - 23 mg/dL    Creatinine 0.9 0.5 - 1.4 mg/dL    Calcium 9.1 8.7 - 10.5 mg/dL    Anion Gap 11 8 - 16 mmol/L    eGFR >60 >60 mL/min/1.73/m2   Comprehensive Metabolic Panel    Collection Time: 07/07/25  4:25 AM   Result Value Ref Range    Sodium 131 (L) 136 - 145 mmol/L    Potassium 4.3 3.5 - 5.1 mmol/L    Chloride 104 95 - 110 mmol/L    CO2 18 (L) 23 - 29 mmol/L    Glucose 101 70 - 110 mg/dL    BUN 11 8 - 23 mg/dL    Creatinine 0.8 0.5 - 1.4 mg/dL    Calcium 8.6 (L) 8.7 - 10.5 mg/dL    Protein Total 7.4 6.0 - 8.4 gm/dL    Albumin 3.4 (L) 3.5 - 5.2 g/dL    Bilirubin Total 0.3 0.1 - 1.0 mg/dL    ALP 55 40 - 150 unit/L    AST 18 11 - 45 unit/L    ALT 14 10 - 44 unit/L    Anion Gap 9 8 - 16 mmol/L    eGFR >60 >60 mL/min/1.73/m2   CBC with Differential    Collection Time: 07/07/25  4:25 AM   Result Value Ref  Range    WBC 13.91 (H) 3.90 - 12.70 K/uL    RBC 3.63 (L) 4.00 - 5.40 M/uL    HGB 10.9 (L) 12.0 - 16.0 gm/dL    HCT 32.2 (L) 37.0 - 48.5 %    MCV 89 82 - 98 fL    MCH 30.0 27.0 - 31.0 pg    MCHC 33.9 32.0 - 36.0 g/dL    RDW 12.0 11.5 - 14.5 %    Platelet Count 331 150 - 450 K/uL    MPV 9.9 9.2 - 12.9 fL    Nucleated RBC 0 <=0 /100 WBC    Neut % 66.2 38 - 73 %    Lymph % 19.6 18 - 48 %    Mono % 12.1 4 - 15 %    Eos % 1.2 <=8 %    Basophil % 0.4 <=1.9 %    Imm Grans % 0.5 0.0 - 0.5 %    Neut # 9.21 (H) 1.8 - 7.7 K/uL    Lymph # 2.72 1 - 4.8 K/uL    Mono # 1.69 (H) 0.3 - 1 K/uL    Eos # 0.17 <=0.5 K/uL    Baso # 0.05 <=0.2 K/uL    Imm Grans # 0.07 (H) 0.00 - 0.04 K/uL   Immunoglobulins (IgG, IgA, IgM) Quantitative    Collection Time: 07/07/25 12:49 PM   Result Value Ref Range    IgA Level 226 40 - 350 mg/dL    IgG Level 1,388 650 - 1,600 mg/dL    IgM Level 84 50 - 300 mg/dL   Comprehensive Metabolic Panel    Collection Time: 07/08/25  5:05 AM   Result Value Ref Range    Sodium 135 (L) 136 - 145 mmol/L    Potassium 4.2 3.5 - 5.1 mmol/L    Chloride 105 95 - 110 mmol/L    CO2 20 (L) 23 - 29 mmol/L    Glucose 92 70 - 110 mg/dL    BUN 10 8 - 23 mg/dL    Creatinine 0.8 0.5 - 1.4 mg/dL    Calcium 8.7 8.7 - 10.5 mg/dL    Protein Total 7.4 6.0 - 8.4 gm/dL    Albumin 3.1 (L) 3.5 - 5.2 g/dL    Bilirubin Total 0.4 0.1 - 1.0 mg/dL    ALP 52 40 - 150 unit/L    AST 14 11 - 45 unit/L    ALT 12 10 - 44 unit/L    Anion Gap 10 8 - 16 mmol/L    eGFR >60 >60 mL/min/1.73/m2   CBC with Differential    Collection Time: 07/08/25  5:05 AM   Result Value Ref Range    WBC 12.12 3.90 - 12.70 K/uL    RBC 3.43 (L) 4.00 - 5.40 M/uL    HGB 10.5 (L) 12.0 - 16.0 gm/dL    HCT 31.1 (L) 37.0 - 48.5 %    MCV 91 82 - 98 fL    MCH 30.6 27.0 - 31.0 pg    MCHC 33.8 32.0 - 36.0 g/dL    RDW 12.3 11.5 - 14.5 %    Platelet Count 326 150 - 450 K/uL    MPV 9.1 (L) 9.2 - 12.9 fL    Nucleated RBC 0 <=0 /100 WBC    Neut % 58.6 38 - 73 %    Lymph % 27.1 18 - 48 %    Mono  % 11.4 4 - 15 %    Eos % 2.1 <=8 %    Basophil % 0.4 <=1.9 %    Imm Grans % 0.4 0.0 - 0.5 %    Neut # 7.11 1.8 - 7.7 K/uL    Lymph # 3.28 1 - 4.8 K/uL    Mono # 1.38 (H) 0.3 - 1 K/uL    Eos # 0.25 <=0.5 K/uL    Baso # 0.05 <=0.2 K/uL    Imm Grans # 0.05 (H) 0.00 - 0.04 K/uL       Microbiology Results (last 7 days)       ** No results found for the last 168 hours. **            Imaging Results    None             Pending Diagnostic Studies:       Procedure Component Value Units Date/Time    H. pylori Antibody, IgG [[id number]] Collected: 07/07/25 1249    Order Status: Sent Lab Status: In process Updated: 07/07/25 1259    Specimen: Blood            Medications:  Reconciled Home Medications:      Medication List        START taking these medications      amoxicillin-clavulanate 875-125mg 875-125 mg per tablet  Commonly known as: AUGMENTIN  [place] 1 tableta por vía oral cada 12 (doce) horas por 5 días.  (Take 1 tablet by mouth every 12 (twelve) hours. for 5 days)            CONTINUE taking these medications      albuterol 90 mcg/actuation inhaler  Commonly known as: PROVENTIL/VENTOLIN HFA  Inhale 1-2 puffs into the lungs every 6 (six) hours as needed for Wheezing. Rescue     alendronate 70 MG tablet  Commonly known as: FOSAMAX  Take 70 mg by mouth every 7 days.     aluminum & magnesium hydroxide-simethicone 400-400-40 mg/5 mL suspension  Commonly known as: MAALOX MAXIMUM STRENGTH  Take 15 mLs by mouth every 6 (six) hours as needed for Indigestion.     cetirizine 10 MG tablet  Commonly known as: ZYRTEC  Take 1 tablet (10 mg total) by mouth once daily.     famotidine 20 MG tablet  Commonly known as: PEPCID  Take 1 tablet (20 mg total) by mouth 2 (two) times daily.     olmesartan 40 MG tablet  Commonly known as: BENICAR  Take 1 tablet (40 mg total) by mouth once daily.     omeprazole 20 MG capsule  Commonly known as: PRILOSEC  Take 1 capsule (20 mg total) by mouth once daily.     ondansetron 4 MG tablet  Commonly known as:  ZOFRAN  Take 1 tablet (4 mg total) by mouth every 8 (eight) hours as needed for Nausea.            STOP taking these medications      fluticasone propionate 50 mcg/actuation nasal spray  Commonly known as: FLONASE     ibuprofen 600 MG tablet  Commonly known as: ADVIL,MOTRIN     LIDOcaine 5 %  Commonly known as: LIDODERM     melatonin 3 mg tablet  Commonly known as: MELATIN              Indwelling Lines/Drains at time of discharge:   Lines/Drains/Airways       None                       Time spent on the discharge of patient:  Greater than 35 minutes         Sakina Shipley DO  Department of Hospital Medicine  [facility] [place] - Med Surg

## 2025-07-08 NOTE — DISCHARGE INSTRUCTIONS
Our goal at [facility] is to always give you outstanding care and exceptional service. You may receive a survey from [employer] by mail, text or e-mail in the next 24-48 hours asking about the care you received with us. The survey should only take 5-10 minutes to complete and is very important to us.     Your feedback provides us with a way to recognize our staff who work tirelessly to provide the best care! Also, your responses help us learn how to improve when your experience was below our aspiration of excellence. We are always looking for ways to improve your stay. We WILL use your feedback to continue making improvements to help us provide the highest quality care. We keep your personal information and feedback confidential. We appreciate your time completing this survey and can't wait to hear from you!!!    We look forward to your continued care with us! Thanks so much for choosing [facility] for your healthcare needs!

## 2025-07-08 NOTE — NURSING
[facility], [facility]  Nurses Note -- 4 Eyes      7/7/2025       Skin assessed on: Q Shift      [x] No Pressure Injuries Present    [x]Prevention Measures Documented    [] Yes LDA  for Pressure Injury Previously documented     [] Yes New Pressure Injury Discovered   [] LDA for New Pressure Injury Added      Attending RN:  Gorge Neil LPN     Second RN:  Saira MELÉNDEZ

## 2025-07-08 NOTE — PLAN OF CARE
Case Management Final Discharge Note    Discharge Disposition: Home    New DME ordered / company name: None    Relevant SDOH / Transition of Care Barriers:  None    Person available to provide assistance at home when needed and their contact information: Patient is independent [phone]    Scheduled followup appointment: PCP- Patient will call to schedule a 2 week hospital follow-u     Transportation: Family will provide transportation home    Patient and family educated on discharge services and updated on DC plan. Bedside RN Emi Turcios notified, patient clear to discharge from Case Management Perspective.       07/08/25 1032   Final Note   Assessment Type Final Discharge Note   Anticipated Discharge Disposition Home   What phone number can be called within the next 1-3 days to see how you are doing after discharge? [phone]   Hospital Resources/Appts/Education Provided Appointments scheduled and added to AVS   Post-Acute Status   Discharge Delays None known at this time

## 2025-07-08 NOTE — HOSPITAL COURSE
75-year-old female with a past medical history of hyperlipidemia, GERD, anxiety admitted on 07/05/2025 acute diverticulitis.  Presented with abdominal pain associated with diarrhea. CT abdomen w/Acute diverticulitis in the descending colon.  No abscess or free perforation. Started on IVF and antibiotics.  Abdominal pain improved.  Diet advanced as tolerated.  Patient tolerated regular diet.  Leukocytosis resolved.     Patient admits feeling better overall.  Able to tolerate diet without any difficulty and without any pain. Pt denies any fever, headaches, chest pain, shortness of breath, palpitations, abdominal pain, nausea, vomiting, or any new weaknesses. Feels ready to go home. Patient's exam on discharge was as follow: Patient is alert and oriented, appears in no acute distress, heart with regular rate and rhythm, lungs clear to asculation with non-labored breathing, abdomen soft, and nontender.  Bilateral lower extremities without any edema or calf tenderness.     Patient was counseled regarding any abnormal labs, differential diagnosis, treatment options, risk-benefit, lifestyle changes, prognosis, current condition, and medications. Patient was interactive and attentive.  Patient's questions were answered in a respectful and timely manner. Patient was instructed to follow-up with PCP within 1 week and to continue taking medications as prescribed. Also, extensively discussed the risks, benefits, and side effects of patient's medications. Discussed with patient about any medication changes. Patient verbalized understanding and agrees to treatment plan.  Patient is stable for discharge.  Patient has no other questions or concerns at this time.  ED precautions discussed with the patient.     line used. Lakisha Castro [id number]    Vital signs are stable. Ambulating without any difficulty. Tolerating p.o. intake without any nausea or vomiting. Afebrile for over 24 hours. Patient is in stable  condition and has no questions or concerns. Patient will be discharge to home once transportation secured . Prescriptions sent to pharmacy.  CM/SW to assist with discharge planning.     Vitals:    07/08/25 0324 07/08/25 0343 07/08/25 0723 07/08/25 1128   BP:  (!) 95/57 108/64 125/80   BP Location:  Left arm Left arm Left arm   Patient Position:  Lying Lying Sitting   Pulse: 68 78 66 84   Resp:  17 18 20   Temp:  98.6 °F (37 °C) 98.1 °F (36.7 °C) 98 °F (36.7 °C)   TempSrc:   Oral Oral   SpO2:  97% 97% 98%   Weight:       Height:

## 2025-07-08 NOTE — ASSESSMENT & PLAN NOTE
Patient's blood pressure range in the last 24 hours was: BP  Min: 95/57  Max: 125/80.The patient's inpatient anti-hypertensive regimen is listed below:  Current Antihypertensives       Plan  - BP is controlled, no changes needed to their regimen

## 2025-07-09 LAB — H PYLORI IGG SERPL QL IA: NEGATIVE

## 2025-07-10 ENCOUNTER — PATIENT OUTREACH (OUTPATIENT)
Dept: ADMINISTRATIVE | Facility: CLINIC | Age: 75
End: 2025-07-10
Payer: COMMERCIAL

## 2025-07-10 NOTE — PROGRESS NOTES
C3 nurse attempted to contact Monique Merchant for a TCC post hospital discharge follow up call. No answer or voicemail available.    The patient does not have a scheduled HOSFU appointment. Unable to route to PCP's staff. No messages routed at this time.

## 2025-07-11 NOTE — PROGRESS NOTES
C3 nurse spoke with Monique Merchant and her daughter, Maribel, for a TCC post hospital discharge follow up call using a . The patient does not have a scheduled HOSFU appointment with her PCP, Otis Justin MD within 5-7 days post hospital discharge date of 7/8/25. C3 nurse was unable to schedule HOSFU appointment in [facility].    Message sent to PCP requesting they contact patient and schedule follow up appointment. Unable to route to her staff.

## 2025-07-16 ENCOUNTER — TELEPHONE (OUTPATIENT)
Dept: GASTROENTEROLOGY | Facility: CLINIC | Age: 75
End: 2025-07-16
Payer: COMMERCIAL

## 2025-07-16 NOTE — TELEPHONE ENCOUNTER
----- Message from Len Tanner MD sent at 7/15/2025  4:58 PM CDT -----  She doesn't need clinic follow up but we were recommending an EGD outpatient. Looks like unfortunately couldn't be scheduled because of her insurance.  ----- Message -----  From: Rebecca Dickens MA  Sent: 6/30/2025   8:13 AM CDT  To: Len Tanner MD    Please review/advise, thank you.    Does patient need a hospital follow up? GI consult was done on 6/28 while an inpatient.  ----- Message -----  From: Amarilis De Leon RN  Sent: 6/29/2025  12:12 PM CDT  To: [facility] Staff    Mayur Conroy MRN#[id number] please call to schedule hospital f/u

## 2025-07-16 NOTE — TELEPHONE ENCOUNTER
Per Dr Tanner patient does not need a clinic follow up and that she would only need an outpatient EGD.    Patient cannot be seen at our location due to [facility]

## 2025-08-19 ENCOUNTER — TELEPHONE (OUTPATIENT)
Dept: ENDOSCOPY | Facility: HOSPITAL | Age: 75
End: 2025-08-19
Payer: COMMERCIAL

## 2025-08-19 DIAGNOSIS — R10.9 ABDOMINAL PAIN, UNSPECIFIED ABDOMINAL LOCATION: Primary | ICD-10-CM

## 2025-08-26 ENCOUNTER — TELEPHONE (OUTPATIENT)
Dept: ENDOSCOPY | Facility: HOSPITAL | Age: 75
End: 2025-08-26

## 2025-08-26 ENCOUNTER — CLINICAL SUPPORT (OUTPATIENT)
Dept: ENDOSCOPY | Facility: HOSPITAL | Age: 75
End: 2025-08-26
Attending: STUDENT IN AN ORGANIZED HEALTH CARE EDUCATION/TRAINING PROGRAM
Payer: COMMERCIAL

## 2025-08-26 DIAGNOSIS — R10.13 EPIGASTRIC PAIN: Primary | ICD-10-CM
